# Patient Record
Sex: MALE | Race: BLACK OR AFRICAN AMERICAN | Employment: UNEMPLOYED | URBAN - METROPOLITAN AREA
[De-identification: names, ages, dates, MRNs, and addresses within clinical notes are randomized per-mention and may not be internally consistent; named-entity substitution may affect disease eponyms.]

---

## 2020-04-12 ENCOUNTER — APPOINTMENT (OUTPATIENT)
Dept: NON INVASIVE DIAGNOSTICS | Age: 55
DRG: 175 | End: 2020-04-12
Attending: SPECIALIST
Payer: MEDICAID

## 2020-04-12 ENCOUNTER — HOSPITAL ENCOUNTER (INPATIENT)
Age: 55
LOS: 3 days | Discharge: HOME | DRG: 175 | End: 2020-04-15
Attending: EMERGENCY MEDICINE | Admitting: INTERNAL MEDICINE
Payer: MEDICAID

## 2020-04-12 ENCOUNTER — APPOINTMENT (OUTPATIENT)
Dept: GENERAL RADIOLOGY | Age: 55
DRG: 175 | End: 2020-04-12
Attending: EMERGENCY MEDICINE
Payer: MEDICAID

## 2020-04-12 DIAGNOSIS — I48.92 ATRIAL FLUTTER, UNSPECIFIED TYPE (HCC): Primary | ICD-10-CM

## 2020-04-12 DIAGNOSIS — R07.9 ACUTE CHEST PAIN: ICD-10-CM

## 2020-04-12 DIAGNOSIS — R55 NEAR SYNCOPE: ICD-10-CM

## 2020-04-12 PROBLEM — M79.2 NEUROPATHIC PAIN: Status: ACTIVE | Noted: 2020-04-12

## 2020-04-12 PROBLEM — K42.9 UMBILICAL HERNIA: Status: ACTIVE | Noted: 2020-04-12

## 2020-04-12 PROBLEM — I10 ESSENTIAL HYPERTENSION: Status: ACTIVE | Noted: 2020-04-12

## 2020-04-12 PROBLEM — I48.19 PERSISTENT ATRIAL FIBRILLATION (HCC): Status: ACTIVE | Noted: 2020-04-12

## 2020-04-12 PROBLEM — I11.9 HYPERTENSIVE HEART DISEASE WITHOUT HEART FAILURE: Status: ACTIVE | Noted: 2020-04-12

## 2020-04-12 PROBLEM — E66.01 MORBID OBESITY (HCC): Status: ACTIVE | Noted: 2020-04-12

## 2020-04-12 PROBLEM — I48.91 ATRIAL FIBRILLATION AND FLUTTER (HCC): Status: ACTIVE | Noted: 2020-04-12

## 2020-04-12 LAB
ALBUMIN SERPL-MCNC: 4.2 G/DL (ref 3.5–5)
ALBUMIN/GLOB SERPL: 0.9 {RATIO} (ref 1.1–2.2)
ALP SERPL-CCNC: 111 U/L (ref 45–117)
ALT SERPL-CCNC: 38 U/L (ref 12–78)
ANION GAP SERPL CALC-SCNC: 13 MMOL/L (ref 5–15)
AST SERPL-CCNC: 18 U/L (ref 15–37)
ATRIAL RATE: 122 BPM
ATRIAL RATE: 242 BPM
BASOPHILS # BLD: 0 K/UL (ref 0–0.1)
BASOPHILS NFR BLD: 0 % (ref 0–1)
BILIRUB SERPL-MCNC: 0.4 MG/DL (ref 0.2–1)
BUN SERPL-MCNC: 19 MG/DL (ref 6–20)
BUN/CREAT SERPL: 13 (ref 12–20)
CALCIUM SERPL-MCNC: 10.2 MG/DL (ref 8.5–10.1)
CALCULATED P AXIS, ECG09: 39 DEGREES
CALCULATED R AXIS, ECG10: 10 DEGREES
CALCULATED R AXIS, ECG10: 30 DEGREES
CALCULATED T AXIS, ECG11: 34 DEGREES
CALCULATED T AXIS, ECG11: 69 DEGREES
CHLORIDE SERPL-SCNC: 105 MMOL/L (ref 97–108)
CO2 SERPL-SCNC: 27 MMOL/L (ref 21–32)
COMMENT, HOLDF: NORMAL
CREAT SERPL-MCNC: 1.43 MG/DL (ref 0.7–1.3)
D DIMER PPP FEU-MCNC: 0.48 MG/L FEU (ref 0–0.65)
DIAGNOSIS, 93000: NORMAL
DIAGNOSIS, 93000: NORMAL
DIFFERENTIAL METHOD BLD: ABNORMAL
EOSINOPHIL # BLD: 0.3 K/UL (ref 0–0.4)
EOSINOPHIL NFR BLD: 3 % (ref 0–7)
ERYTHROCYTE [DISTWIDTH] IN BLOOD BY AUTOMATED COUNT: 13.5 % (ref 11.5–14.5)
GLOBULIN SER CALC-MCNC: 4.8 G/DL (ref 2–4)
GLUCOSE SERPL-MCNC: 120 MG/DL (ref 65–100)
HCT VFR BLD AUTO: 48.5 % (ref 36.6–50.3)
HGB BLD-MCNC: 15.8 G/DL (ref 12.1–17)
IMM GRANULOCYTES # BLD AUTO: 0.1 K/UL (ref 0–0.04)
IMM GRANULOCYTES NFR BLD AUTO: 1 % (ref 0–0.5)
LYMPHOCYTES # BLD: 3 K/UL (ref 0.8–3.5)
LYMPHOCYTES NFR BLD: 31 % (ref 12–49)
MAGNESIUM SERPL-MCNC: 1.9 MG/DL (ref 1.6–2.4)
MCH RBC QN AUTO: 30 PG (ref 26–34)
MCHC RBC AUTO-ENTMCNC: 32.6 G/DL (ref 30–36.5)
MCV RBC AUTO: 92.2 FL (ref 80–99)
MONOCYTES # BLD: 0.8 K/UL (ref 0–1)
MONOCYTES NFR BLD: 9 % (ref 5–13)
NEUTS SEG # BLD: 5.4 K/UL (ref 1.8–8)
NEUTS SEG NFR BLD: 57 % (ref 32–75)
NRBC # BLD: 0 K/UL (ref 0–0.01)
NRBC BLD-RTO: 0 PER 100 WBC
P-R INTERVAL, ECG05: 182 MS
PLATELET # BLD AUTO: 288 K/UL (ref 150–400)
PMV BLD AUTO: 9.9 FL (ref 8.9–12.9)
POTASSIUM SERPL-SCNC: 4 MMOL/L (ref 3.5–5.1)
PROT SERPL-MCNC: 9 G/DL (ref 6.4–8.2)
Q-T INTERVAL, ECG07: 418 MS
Q-T INTERVAL, ECG07: 424 MS
QRS DURATION, ECG06: 82 MS
QRS DURATION, ECG06: 98 MS
QTC CALCULATION (BEZET), ECG08: 593 MS
QTC CALCULATION (BEZET), ECG08: 604 MS
RBC # BLD AUTO: 5.26 M/UL (ref 4.1–5.7)
SAMPLES BEING HELD,HOLD: NORMAL
SODIUM SERPL-SCNC: 145 MMOL/L (ref 136–145)
TROPONIN I BLD-MCNC: <0.04 NG/ML (ref 0–0.08)
TROPONIN I SERPL-MCNC: <0.05 NG/ML
TSH SERPL DL<=0.05 MIU/L-ACNC: 0.75 UIU/ML (ref 0.36–3.74)
VENTRICULAR RATE, ECG03: 121 BPM
VENTRICULAR RATE, ECG03: 122 BPM
WBC # BLD AUTO: 9.6 K/UL (ref 4.1–11.1)

## 2020-04-12 PROCEDURE — 96365 THER/PROPH/DIAG IV INF INIT: CPT

## 2020-04-12 PROCEDURE — 99285 EMERGENCY DEPT VISIT HI MDM: CPT

## 2020-04-12 PROCEDURE — 83735 ASSAY OF MAGNESIUM: CPT

## 2020-04-12 PROCEDURE — 74011250637 HC RX REV CODE- 250/637: Performed by: SPECIALIST

## 2020-04-12 PROCEDURE — 74011250637 HC RX REV CODE- 250/637: Performed by: NURSE PRACTITIONER

## 2020-04-12 PROCEDURE — 96366 THER/PROPH/DIAG IV INF ADDON: CPT

## 2020-04-12 PROCEDURE — 74011250636 HC RX REV CODE- 250/636: Performed by: SPECIALIST

## 2020-04-12 PROCEDURE — 94660 CPAP INITIATION&MGMT: CPT

## 2020-04-12 PROCEDURE — 74011000258 HC RX REV CODE- 258: Performed by: STUDENT IN AN ORGANIZED HEALTH CARE EDUCATION/TRAINING PROGRAM

## 2020-04-12 PROCEDURE — 96372 THER/PROPH/DIAG INJ SC/IM: CPT

## 2020-04-12 PROCEDURE — 74011250636 HC RX REV CODE- 250/636: Performed by: STUDENT IN AN ORGANIZED HEALTH CARE EDUCATION/TRAINING PROGRAM

## 2020-04-12 PROCEDURE — 93005 ELECTROCARDIOGRAM TRACING: CPT

## 2020-04-12 PROCEDURE — 99218 HC RM OBSERVATION: CPT

## 2020-04-12 PROCEDURE — 85379 FIBRIN DEGRADATION QUANT: CPT

## 2020-04-12 PROCEDURE — 71045 X-RAY EXAM CHEST 1 VIEW: CPT

## 2020-04-12 PROCEDURE — 84443 ASSAY THYROID STIM HORMONE: CPT

## 2020-04-12 PROCEDURE — 85025 COMPLETE CBC W/AUTO DIFF WBC: CPT

## 2020-04-12 PROCEDURE — 74011000250 HC RX REV CODE- 250: Performed by: STUDENT IN AN ORGANIZED HEALTH CARE EDUCATION/TRAINING PROGRAM

## 2020-04-12 PROCEDURE — 36415 COLL VENOUS BLD VENIPUNCTURE: CPT

## 2020-04-12 PROCEDURE — 84484 ASSAY OF TROPONIN QUANT: CPT

## 2020-04-12 PROCEDURE — C8923 2D TTE W OR W/O FOL W/CON,CO: HCPCS

## 2020-04-12 PROCEDURE — 96375 TX/PRO/DX INJ NEW DRUG ADDON: CPT

## 2020-04-12 PROCEDURE — 80053 COMPREHEN METABOLIC PANEL: CPT

## 2020-04-12 PROCEDURE — 65660000001 HC RM ICU INTERMED STEPDOWN

## 2020-04-12 PROCEDURE — 96376 TX/PRO/DX INJ SAME DRUG ADON: CPT

## 2020-04-12 PROCEDURE — 74011000250 HC RX REV CODE- 250

## 2020-04-12 RX ORDER — FLUTICASONE PROPIONATE 50 MCG
2 SPRAY, SUSPENSION (ML) NASAL DAILY
COMMUNITY

## 2020-04-12 RX ORDER — LISINOPRIL 20 MG/1
20 TABLET ORAL DAILY
COMMUNITY

## 2020-04-12 RX ORDER — ONDANSETRON 2 MG/ML
4 INJECTION INTRAMUSCULAR; INTRAVENOUS
Status: DISCONTINUED | OUTPATIENT
Start: 2020-04-12 | End: 2020-04-15 | Stop reason: HOSPADM

## 2020-04-12 RX ORDER — ATORVASTATIN CALCIUM 20 MG/1
20 TABLET, FILM COATED ORAL DAILY
COMMUNITY

## 2020-04-12 RX ORDER — HEPARIN SODIUM 5000 [USP'U]/ML
5000 INJECTION, SOLUTION INTRAVENOUS; SUBCUTANEOUS EVERY 8 HOURS
Status: DISCONTINUED | OUTPATIENT
Start: 2020-04-13 | End: 2020-04-13

## 2020-04-12 RX ORDER — SODIUM CHLORIDE 0.9 % (FLUSH) 0.9 %
5-40 SYRINGE (ML) INJECTION EVERY 8 HOURS
Status: DISCONTINUED | OUTPATIENT
Start: 2020-04-12 | End: 2020-04-15 | Stop reason: HOSPADM

## 2020-04-12 RX ORDER — GABAPENTIN 600 MG/1
600 TABLET ORAL
COMMUNITY

## 2020-04-12 RX ORDER — DIGOXIN 0.25 MG/ML
250 INJECTION INTRAMUSCULAR; INTRAVENOUS EVERY 6 HOURS
Status: DISPENSED | OUTPATIENT
Start: 2020-04-12 | End: 2020-04-13

## 2020-04-12 RX ORDER — MULTIVIT WITH MINERALS/HERBS
1 TABLET ORAL DAILY
COMMUNITY

## 2020-04-12 RX ORDER — DILTIAZEM HYDROCHLORIDE 5 MG/ML
10 INJECTION INTRAVENOUS
Status: COMPLETED | OUTPATIENT
Start: 2020-04-12 | End: 2020-04-12

## 2020-04-12 RX ORDER — FUROSEMIDE 20 MG/1
20 TABLET ORAL DAILY
COMMUNITY

## 2020-04-12 RX ORDER — LUBIPROSTONE 8 UG/1
8 CAPSULE, GELATIN COATED ORAL 2 TIMES DAILY WITH MEALS
COMMUNITY

## 2020-04-12 RX ORDER — ATORVASTATIN CALCIUM 20 MG/1
20 TABLET, FILM COATED ORAL DAILY
Status: DISCONTINUED | OUTPATIENT
Start: 2020-04-13 | End: 2020-04-15 | Stop reason: HOSPADM

## 2020-04-12 RX ORDER — LORATADINE AND PSEUDOEPHEDRINE SULFATE 10; 240 MG/1; MG/1
1 TABLET, EXTENDED RELEASE ORAL DAILY
COMMUNITY

## 2020-04-12 RX ORDER — GABAPENTIN 600 MG/1
600 TABLET ORAL
Status: DISCONTINUED | OUTPATIENT
Start: 2020-04-12 | End: 2020-04-15 | Stop reason: HOSPADM

## 2020-04-12 RX ORDER — LORATADINE 10 MG/1
10 TABLET ORAL DAILY
Status: DISCONTINUED | OUTPATIENT
Start: 2020-04-13 | End: 2020-04-12

## 2020-04-12 RX ORDER — METOPROLOL TARTRATE 50 MG/1
50 TABLET ORAL EVERY 8 HOURS
Status: DISCONTINUED | OUTPATIENT
Start: 2020-04-12 | End: 2020-04-14

## 2020-04-12 RX ORDER — CITALOPRAM 20 MG/1
20 TABLET, FILM COATED ORAL DAILY
Status: DISCONTINUED | OUTPATIENT
Start: 2020-04-13 | End: 2020-04-15 | Stop reason: HOSPADM

## 2020-04-12 RX ORDER — ENOXAPARIN SODIUM 100 MG/ML
1 INJECTION SUBCUTANEOUS ONCE
Status: COMPLETED | OUTPATIENT
Start: 2020-04-12 | End: 2020-04-12

## 2020-04-12 RX ORDER — FLUTICASONE PROPIONATE 50 MCG
2 SPRAY, SUSPENSION (ML) NASAL DAILY
Status: DISCONTINUED | OUTPATIENT
Start: 2020-04-13 | End: 2020-04-12

## 2020-04-12 RX ORDER — SODIUM CHLORIDE 0.9 % (FLUSH) 0.9 %
5-40 SYRINGE (ML) INJECTION AS NEEDED
Status: DISCONTINUED | OUTPATIENT
Start: 2020-04-12 | End: 2020-04-15 | Stop reason: HOSPADM

## 2020-04-12 RX ORDER — LUBIPROSTONE 8 UG/1
8 CAPSULE, GELATIN COATED ORAL 2 TIMES DAILY WITH MEALS
Status: DISCONTINUED | OUTPATIENT
Start: 2020-04-12 | End: 2020-04-15 | Stop reason: HOSPADM

## 2020-04-12 RX ORDER — AMLODIPINE BESYLATE 10 MG/1
10 TABLET ORAL DAILY
COMMUNITY

## 2020-04-12 RX ORDER — DILTIAZEM HYDROCHLORIDE 5 MG/ML
INJECTION INTRAVENOUS
Status: COMPLETED
Start: 2020-04-12 | End: 2020-04-12

## 2020-04-12 RX ORDER — AMIODARONE HYDROCHLORIDE 200 MG/1
400 TABLET ORAL 3 TIMES DAILY
Status: DISCONTINUED | OUTPATIENT
Start: 2020-04-12 | End: 2020-04-14

## 2020-04-12 RX ORDER — ACETAMINOPHEN 325 MG/1
650 TABLET ORAL
Status: DISCONTINUED | OUTPATIENT
Start: 2020-04-12 | End: 2020-04-15 | Stop reason: HOSPADM

## 2020-04-12 RX ORDER — CITALOPRAM 20 MG/1
20 TABLET, FILM COATED ORAL DAILY
COMMUNITY

## 2020-04-12 RX ADMIN — LUBIPROSTONE 8 MCG: 8 CAPSULE, GELATIN COATED ORAL at 18:15

## 2020-04-12 RX ADMIN — DILTIAZEM HYDROCHLORIDE 10 MG: 5 INJECTION, SOLUTION INTRAVENOUS at 14:29

## 2020-04-12 RX ADMIN — DIGOXIN 250 MCG: 0.25 INJECTION INTRAMUSCULAR; INTRAVENOUS at 14:27

## 2020-04-12 RX ADMIN — Medication 10 ML: at 18:15

## 2020-04-12 RX ADMIN — ENOXAPARIN SODIUM 200 MG: 100 INJECTION SUBCUTANEOUS at 14:31

## 2020-04-12 RX ADMIN — GABAPENTIN 600 MG: 600 TABLET, FILM COATED ORAL at 22:25

## 2020-04-12 RX ADMIN — SODIUM CHLORIDE 1.5 ML: 9 INJECTION INTRAMUSCULAR; INTRAVENOUS; SUBCUTANEOUS at 14:12

## 2020-04-12 RX ADMIN — DILTIAZEM HYDROCHLORIDE 10 MG/HR: 5 INJECTION, SOLUTION INTRAVENOUS at 16:52

## 2020-04-12 RX ADMIN — METOPROLOL TARTRATE 50 MG: 50 TABLET, FILM COATED ORAL at 17:30

## 2020-04-12 RX ADMIN — AMIODARONE HYDROCHLORIDE 400 MG: 200 TABLET ORAL at 14:30

## 2020-04-12 RX ADMIN — DILTIAZEM HYDROCHLORIDE 2.5 MG/HR: 5 INJECTION, SOLUTION INTRAVENOUS at 14:30

## 2020-04-12 RX ADMIN — DILTIAZEM HYDROCHLORIDE 10 MG: 5 INJECTION INTRAVENOUS at 13:04

## 2020-04-12 RX ADMIN — Medication 10 ML: at 22:26

## 2020-04-12 RX ADMIN — DILTIAZEM HYDROCHLORIDE 12.5 MG/HR: 5 INJECTION, SOLUTION INTRAVENOUS at 17:13

## 2020-04-12 NOTE — H&P
[facility] [facility] Adult  Hospitalist Group  History and Physical    Primary Care Provider: Luther Ayers MD  Date of Service:  4/12/2020    Subjective:     Ángel Zabala is a 49 y.o. male who to [facility] emergency department with complaint of chest pain, diaphoresis and near syncope x2 today. Patient reported that around 9 AM he had an episode of near syncope which resolved and then again this afternoon at [facility] when he also had chest pain for approximately 15 minutes which did not radiate. He reported feeling palpitations, shortness of breath diaphoresis. He was transferred to [facility] [place] ED with concern for STEMI. He was seen by cardiology Dr. Dick Montesinos who determined he did not have STEMI and does not warrant cardiac cath at this time. Patient was noted to be in atrial flutter/A. fib persistent and was started on a Cardizem drip. In ED included negative d-dimer, negative troponin x1, normal CBC, elevated glucose, elevated creatinine of 1.43 with GFR greater than 60. No history of CKD per patient, however, he is extremely morbidly obese and has a very significant family history of diabetes including both of his parents, both of his grandparents and all of his siblings. He reports that he does not have diabetes, although I suspect that he does based on his elevated glucose and creatinine, his weight, amily history as well as his explanation of being on Lasix due to lower extremity edema which he reports is not related to his heart but is because he \"drinks a ton of water. \"  He reports drinking 2 to 4 gallons of water per day plus milk and juice. He also reports polyphagia but states that he prefers salty foods over sweet. At this time the patient is stable, alert and oriented x4, moves all extremities. He states he has never been  and has no children, but does help take care of his nephew who is 15.  He reports that today is the 3rd episode of near syncope w/ feeling palpitations that he has had and that the first one was approximately 6 months ago. He reports that he has not had it evaluated prior to today. At this time, patient reports fatigue, polydipsia but denies dizziness, double vision, HA, CP, SOB, palpitations, cough, abdominal pain, n/v/d or weakness. He reports chronic neuropathic pain to E. At this time, we will admit to step down. Review of Systems:    A comprehensive review of systems was negative except for that written in the History of Present Illness. Past Medical History:   Diagnosis Date    Atrial flutter ([address]     Depression     Edema of both legs     Elevated serum creatinine     Elevated serum glucose     HLD (hyperlipidemia)     Hypertensive heart disease without heart failure 4/12/2020    Morbid obesity ([facility] 4/12/2020    Near syncope     Neuropathic pain 4/12/2020    SHARA treated with BiPAP     Persistent atrial fibrillation 4/32/6888    Umbilical hernia 9/98/8197      Past Surgical History:   Procedure Laterality Date    HX HEENT      Jaw    HX ORTHOPAEDIC      Left Femur    HX ORTHOPAEDIC      Left Hip     Prior to Admission medications    Medication Sig Start Date End Date Taking? Authorizing Provider   lubiPROStone (Amitiza) 8 mcg capsule Take 8 mcg by mouth two (2) times daily (with meals). Yes Provider, Historical   citalopram (CELEXA) 20 mg tablet Take 20 mg by mouth daily. Yes Provider, Historical   lisinopriL (PRINIVIL, ZESTRIL) 20 mg tablet Take 20 mg by mouth daily. Yes Provider, Historical   furosemide (LASIX) 20 mg tablet Take 20 mg by mouth daily. Indications: visible water retention   Yes Provider, Historical   amLODIPine (NORVASC) 10 mg tablet Take 10 mg by mouth daily. Yes Provider, Historical   atorvastatin (LIPITOR) 20 mg tablet Take 20 mg by mouth daily. Yes Provider, Historical   b complex vitamins tablet Take 1 Tab by mouth daily.    Yes Provider, Historical   gabapentin (NEURONTIN) 600 mg tablet Take 600 mg by mouth nightly. Yes Provider, Historical   fluticasone propionate (Flonase Allergy Relief) 50 mcg/actuation nasal spray 2 Sprays by Both Nostrils route daily. Yes Provider, Historical   loratadine-pseudoephedrine (Claritin-D 24 Hour)  mg per tablet Take 1 Tab by mouth daily. Yes Provider, Historical     No Known Allergies   Family History   Problem Relation Age of Onset    Diabetes Mother     Schizophrenia Mother     Diabetes Father     Prostate Cancer Father     Kidney Disease Father         ESRD ON HD    Diabetes Sister     Diabetes Brother     Kidney Disease Brother         ESRD ON HD        SOCIAL HISTORY:  Patient resides at home with his nephew  Patient ambulates independently  Smoking history: Never  Alcohol history: Occasional    Recent Travel Screening and Travel History documentation     Travel Screening       Question Response     Do you have any of the following symptoms? Cough     In the last month, have you been in contact with someone who was confirmed or suspected to have Coronavirus / COVID-19? No / Unsure     Have you traveled internationally in the last month? No      Travel History   Travel since 03/12/20     No documented travel since 03/12/20          Objective:       Physical Exam:   Visit Vitals  /64 (BP 1 Location: Left arm, BP Patient Position: At rest)   Pulse (!) 124   Temp 98.8 °F (37.1 °C)   Resp 18   Wt (!) 206 kg (454 lb 2.4 oz)   SpO2 93%     General:  Alert, cooperative, no distress, appears stated age, super morbidly obese   Head:  Normocephalic, without obvious abnormality, atraumatic. Eyes:  Conjunctivae/corneas clear. Pupils equal, round, reactive to light. Extraocular movements intact. Lungs:   Clear to auscultation bilaterally. Abdomen:   Soft, non-tender. Bowel sounds normal. +protuberant, +hernia   Extremities: Extremities normal, atraumatic, no cyanosis, chronic edema, slight, L>R   Pulses: 2+ and symmetric all extremities.    Skin: Skin color, texture, turgor normal. No rashes or lesions. Lymph nodes: Cervical, supraclavicular, and axillary nodes normal.   Neurologic: CNII-XII intact. Normal strength, sensation, and reflexes throughout. Cap refill: Brisk, less than 3 seconds  Pulses: 2+, symmetric in all extremities    Data Review: All diagnostic labs and studies have been reviewed. Recent Results (from the past 24 hour(s))   EKG, 12 LEAD, INITIAL    Collection Time: 04/12/20 12:26 PM   Result Value Ref Range    Ventricular Rate 122 BPM    Atrial Rate 122 BPM    P-R Interval 182 ms    QRS Duration 98 ms    Q-T Interval 424 ms    QTC Calculation (Bezet) 604 ms    Calculated P Axis 39 degrees    Calculated R Axis 10 degrees    Calculated T Axis 34 degrees    Diagnosis       Possible Atrial flutter with 2 to 1 block  ST elevation, consider inferolateral injury or acute infarct  Nonspecific ST segment changes  Abnormal ECG  No previous ECGs available  Confirmed by Yoly Degroot MD, [facility] ([id number]) on 4/12/2020 2:49:18 PM     TSH 3RD GENERATION    Collection Time: 04/12/20 12:29 PM   Result Value Ref Range    TSH 0.75 0.36 - 3.74 uIU/mL   CBC WITH AUTOMATED DIFF    Collection Time: 04/12/20 12:29 PM   Result Value Ref Range    WBC 9.6 4.1 - 11.1 K/uL    RBC 5.26 4.10 - 5.70 M/uL    HGB 15.8 12.1 - 17.0 g/dL    HCT 48.5 36.6 - 50.3 %    MCV 92.2 80.0 - 99.0 FL    MCH 30.0 26.0 - 34.0 PG    MCHC 32.6 30.0 - 36.5 g/dL    RDW 13.5 11.5 - 14.5 %    PLATELET 836 898 - 910 K/uL    MPV 9.9 8.9 - 12.9 FL    NRBC 0.0 0  WBC    ABSOLUTE NRBC 0.00 0.00 - 0.01 K/uL    NEUTROPHILS 57 32 - 75 %    LYMPHOCYTES 31 12 - 49 %    MONOCYTES 9 5 - 13 %    EOSINOPHILS 3 0 - 7 %    BASOPHILS 0 0 - 1 %    IMMATURE GRANULOCYTES 1 (H) 0.0 - 0.5 %    ABS. NEUTROPHILS 5.4 1.8 - 8.0 K/UL    ABS. LYMPHOCYTES 3.0 0.8 - 3.5 K/UL    ABS. MONOCYTES 0.8 0.0 - 1.0 K/UL    ABS. EOSINOPHILS 0.3 0.0 - 0.4 K/UL    ABS. BASOPHILS 0.0 0.0 - 0.1 K/UL    ABS. IMM.  GRANS. 0.1 (H) 0.00 - 0.04 K/UL DF AUTOMATED     MAGNESIUM    Collection Time: 04/12/20 12:29 PM   Result Value Ref Range    Magnesium 1.9 1.6 - 2.4 mg/dL   METABOLIC PANEL, COMPREHENSIVE    Collection Time: 04/12/20 12:29 PM   Result Value Ref Range    Sodium 145 136 - 145 mmol/L    Potassium 4.0 3.5 - 5.1 mmol/L    Chloride 105 97 - 108 mmol/L    CO2 27 21 - 32 mmol/L    Anion gap 13 5 - 15 mmol/L    Glucose 120 (H) 65 - 100 mg/dL    BUN 19 6 - 20 MG/DL    Creatinine 1.43 (H) 0.70 - 1.30 MG/DL    BUN/Creatinine ratio 13 12 - 20      GFR est AA >60 >60 ml/min/1.73m2    GFR est non-AA 52 (L) >60 ml/min/1.73m2    Calcium 10.2 (H) 8.5 - 10.1 MG/DL    Bilirubin, total 0.4 0.2 - 1.0 MG/DL    ALT (SGPT) 38 12 - 78 U/L    AST (SGOT) 18 15 - 37 U/L    Alk. phosphatase 111 45 - 117 U/L    Protein, total 9.0 (H) 6.4 - 8.2 g/dL    Albumin 4.2 3.5 - 5.0 g/dL    Globulin 4.8 (H) 2.0 - 4.0 g/dL    A-G Ratio 0.9 (L) 1.1 - 2.2     TROPONIN I    Collection Time: 04/12/20 12:29 PM   Result Value Ref Range    Troponin-I, Qt. <0.05 <0.05 ng/mL   SAMPLES BEING HELD    Collection Time: 04/12/20 12:29 PM   Result Value Ref Range    SAMPLES BEING HELD 1BLU     COMMENT        Add-on orders for these samples will be processed based on acceptable specimen integrity and analyte stability, which may vary by analyte.    POC TROPONIN-I    Collection Time: 04/12/20 12:29 PM   Result Value Ref Range    Troponin-I (POC) <0.04 0.00 - 0.08 ng/mL   EKG, 12 LEAD, INITIAL    Collection Time: 04/12/20  1:29 PM   Result Value Ref Range    Ventricular Rate 121 BPM    Atrial Rate 242 BPM    QRS Duration 82 ms    Q-T Interval 418 ms    QTC Calculation (Bezet) 593 ms    Calculated R Axis 30 degrees    Calculated T Axis 69 degrees    Diagnosis       Atrial flutter  ST elevation, consider inferior injury or acute infarct    When compared with ECG of 12-APR-2020 12:33,  MANUAL COMPARISON REQUIRED, DATA IS UNCONFIRMED  Confirmed by Mae Knox MD, [facility] [place] [facility] ([id number]) on 4/12/2020 2:50:52 PM     D DIMER Collection Time: 04/12/20  1:47 PM   Result Value Ref Range    D-dimer 0.48 0.00 - 0.65 mg/L FEU       Chest x-ray  CXR Results  (Last 48 hours)               04/12/20 1243  XR CHEST PORT Final result    Impression:  IMPRESSION: No acute cardiopulmonary disease. Narrative:  INDICATION: Chest pain. Portable AP semiupright views of the chest.       There is no prior study for direct comparison. Cardiomediastinal silhouette is within normal limits. Lungs are grossly clear   bilaterally. Pleural spaces are normal. Osseous structures are intact. Echo 4/12/2020:  Result status: Final result   · Image quality for this study was technically difficult. · Contrast used: DEFINITY. · Normal cavity size, systolic function (ejection fraction normal) and diastolic function. Severe concentric hypertrophy. Estimated left ventricular ejection fraction is 65 - 70%. No regional wall motion abnormality noted. Normal left ventricular strain. · Aortic valve sclerosis with no significant stenosis. · Mitral valve non-specific thickening.          Assessment:     Principal Problem:    Atrial flutter ([address] (4/12/2020)    Active Problems:    Persistent atrial fibrillation (4/12/2020)      Morbid obesity ([address] (4/12/2020)      Hypertensive heart disease without heart failure (4/12/2020)      Neuropathic pain (4/12/2020)      Chest pain (4/12/2020)      Near syncope ()      Edema of both legs ()      Elevated serum glucose ()      Elevated serum creatinine ()      Atrial fibrillation and flutter ([address] (4/12/2020)        Plan:     Near syncope x2: Suspect 2/2 afib/aflutter  -Seen by cardiology in ED; no previous cardiac hx  -CXR neg  -Orthostatic VS x3    Persistent Afib/Aflutter  -New dx  -Seen by cards in ED, started on dig, amiodarone and metoprolol PO  -On cardizem gtt, goal for HR less than 100  -Trop neg x1  -TSH NL   -Echo complete, unremarkable  -Appreciate cards input    Chest Pain  -Resolved, likely 2/2 above  -Trop negative x1  -Appreciate cards input; no cath indicated at this time    HTN  -Takes lisinopril and amlodipine at home; hold for now  -Started on amiodarone, dig and metoprolol by cards today  -Currently on cardizem gtt    Elevated creatinine: w/ normal GFR for AA  -Unclear if this is CKD as pt has no hx  -Avoid nephrotoxic agents  -Repeat labs in AM    Elevated glucose: Denies hx DM  -Suspect he has DM d/t elevated glucose on labs, hx of polydipsia/phagia/uria, strong family hx, morbid obesity  -Check A1c, fasting glucose in AM; also check ADH    Hx BLE edema  -Pt reports d/t drinking 2-4 gallons of water daily  -Hold lasix for now  -Only trace Edema; ED ordered doppler of LLE d/t neuropathic pain/edema-pending  -Check for DM, ADH  -Daily weights, I/Os  -Ambulate, OOB TID and PRN    Hx Depression  -Continue celexa    Hx HLD  -Continue lipitor    Hx LLE neuropathy  -Continue gabapentin    DVTppx: Heparin  GIppx: NA  Diet: Cardiac  Code Status: Full  Activity: Ambulates independently, OOB TID and PRN w/ assist  Discharge: TBD      Signed By: Dee Hector NP     April 12, 2020

## 2020-04-12 NOTE — ROUTINE PROCESS
TRANSFER - OUT REPORT: 
 
Verbal report given to Barbara Mata RN (name) on Brenda Lion  being transferred to [facility] ED (unit) for routine progression of care Report consisted of patients Situation, Background, Assessment and  
Recommendations(SBAR). Information from the following report(s) SBAR, ED Summary, [facility] and Recent Results was reviewed with the receiving nurse. Lines:  
Peripheral IV 04/12/20 Right Antecubital (Active) Site Assessment Clean, dry, & intact 4/12/2020 12:27 PM  
Phlebitis Assessment 0 4/12/2020 12:27 PM  
Infiltration Assessment 0 4/12/2020 12:27 PM  
Dressing Status Clean, dry, & intact 4/12/2020 12:27 PM  
Dressing Type Transparent 4/12/2020 12:27 PM  
Hub Color/Line Status Flushed;Patent 4/12/2020 12:27 PM  
   
Peripheral IV 04/12/20 Left Hand (Active) Site Assessment Clean, dry, & intact 4/12/2020 12:32 PM  
Phlebitis Assessment 0 4/12/2020 12:32 PM  
Infiltration Assessment 0 4/12/2020 12:32 PM  
Dressing Status Clean, dry, & intact 4/12/2020 12:32 PM  
Dressing Type Transparent 4/12/2020 12:32 PM  
Hub Color/Line Status Flushed;Patent 4/12/2020 12:32 PM  
  
 
Opportunity for questions and clarification was provided. Patient transported with: 
 Critical Care Transport

## 2020-04-12 NOTE — ED NOTES
Patient presents as a transfer from [facility] for possible acute MI and near syncope, I evaluated the patient at the bedside as did cardiology Leighann Daniels), repeated the EKG which indicated atrial flutter with a 2:1 block, still in RVR. Maintaining good systolic pressures. Low Wells score, denies any hx DVT/PE, hemoptysis, recent surgery/hospitalization or extended travel, no hx malignancy but did report some pain in his left leg albeit more chronic in nature so I added on a dimer which was ultimately negative so doubt pulmonary embolism. Trace pitting edema in the lower extremities with longstanding hx exertional dyspnea so may have concomitant CHF. US LLE to rule out DVT pending. Repeat EKG here does not indicate findings that would suggest STEMI, interpretation is below. Ordered second bolus of IV Cardizem 10 mg for a total of 20 mg, infusion started for persistent rapid ventricular rate in the 120's. Dr. Enedelia Abbott agreed with initiating Lovenox 1 mg/kg as he woke up with these symptoms, unclear time of onset. Will need admission for continued management, echocardiogram, rate control. EKG as interpreted by me:  Performed at 1329, indicates atrial flutter with a ventricular rate of 121, normal axis, otherwise normal intervals, no ST or T wave changes that meet STEMI criteria. Hospitalist Perfect Serve for Admission  2:29 PM    ED Room Number:   YG22/52  Patient Name and age:  Efrain Billy 49 y.o.  male  Working Diagnosis:     1. Atrial flutter, unspecified type ([address]    2. Near syncope    3.  Acute chest pain      COVID-19 Suspicion:   no  Readmission:    no  Isolation Requirements:  no  Recommended Level of Care: telemetry  Code Status:    Full Code  Department:    [facility] Adult ED - 21   Other:      Transferred from [facility] for near syncope and possible MI but presented in atrial flutter, seen by cardiology Leighann Dainels), agreed with Cardizem infusion (received total of 20 mg IV bolus) and initiating Lovenox for now, he did not feel this needed cath lab activation.

## 2020-04-12 NOTE — CONSULTS
Consult Note      Assessment:    Patient Active Problem List   Diagnosis Code    Persistent atrial fibrillation I48.19    Atrial flutter (HCC) I48.92    Essential hypertension I10    Morbid obesity ([facility] [address] F89.65    Umbilical hernia V40.0    Hypertensive heart disease without heart failure I11.9    Neuropathic pain M79.2       Recommendations:    I suspect his symptoms are due to acute onset of atrial flutter/fibrillation today. He has morbid obesity and treated hypertension, severe neuropathy invloving LLE, umbilical hernia. Will attempt medical cardioversion, admit to hospitalist, discussed with ER physician. No evidence for STEMI. Mela Bacon MD  [phone]  Babar Kaiser is a 49 y.o. male who presented 4/12/2020 with complaints of shortness of breath, chest pain, rapid heart rate, dizziness and diaphoresis, onset today. The symptoms began approximately 3 hours ago. He has no history of cardiac disease including CAD, MI, CHF, atrial fib and arrhythmias/ectopy. He has morbid obesity considering gastric bypass, also treated hypertension. Initial EKG was read by computer as possible STEMI, he has no chest pain at present, fast HR about 130. Examination by the ER physician suggested the possibility of MI and atrial fib. At present the patient has slightly improved since initial presentation. Therapy thus far has included medication for heart rate control. Cardiology has been consulted to assist in the management of this patient.     Current Facility-Administered Medications   Medication Dose Route Frequency    digoxin (LANOXIN) injection 250 mcg  250 mcg IntraVENous Q6H    amiodarone (CORDARONE) tablet 400 mg  400 mg Oral TID    dilTIAZem (CARDIZEM) 125 mg in dextrose 5% 125 mL infusion  0-15 mg/hr IntraVENous TITRATE    dilTIAZem (CARDIZEM) injection 10 mg  10 mg IntraVENous NOW    enoxaparin (LOVENOX) injection 200 mg  1 mg/kg SubCUTAneous ONCE    metoprolol tartrate (LOPRESSOR) tablet 50 mg  50 mg Oral Q8H     No current outpatient medications on file. Past Medical History:   Diagnosis Date    Hypertension      Patient Active Problem List   Diagnosis Code    Persistent atrial fibrillation I48.19    Atrial flutter (HCC) I48.92    Essential hypertension I10    Morbid obesity ([facility] [address] W28.07    Umbilical hernia M63.8    Hypertensive heart disease without heart failure I11.9    Neuropathic pain M79.2     No Known Allergies  Social History     Tobacco Use    Smoking status: Never Smoker    Smokeless tobacco: Never Used   Substance Use Topics    Alcohol use: Yes     Comment: rarely    Drug use: Yes     Types: Marijuana     History reviewed. No pertinent family history. Review of Symptoms:  A comprehensive review of systems was negative except for that written in the HPI. Objective:      Visit Vitals  /72   Pulse (!) 125   Temp 98.3 °F (36.8 °C)   Resp 18   Wt (!) 206 kg (454 lb 2.4 oz)   SpO2 96%      Physical Exam    Visit Vitals  /72   Pulse (!) 125   Temp 98.3 °F (36.8 °C)   Resp 18   Wt (!) 206 kg (454 lb 2.4 oz)   SpO2 96%     General Appearance:  Well developed, well nourished,alert and oriented x 3, morbidly obese individual in no acute distress. Ears/Nose/Mouth/Throat:   Hearing grossly normal.         Neck: Supple. Chest:   Lungs clear to auscultation bilaterally. Cardiovascular:  Regular rate and rhythm, S1, S2 normal, no murmur. Abdomen:   Soft, non-tender, bowel sounds are active, umbilical hernia. Extremities: Trace edema bilaterally. Skin: Warm and dry.                Cardiographics    Telemetry: AFIB  ECG: atrial fibrillation, rate 130  Echocardiogram: Normal and reviewed by myself    Labs:   Recent Results (from the past 24 hour(s))   EKG, 12 LEAD, INITIAL    Collection Time: 04/12/20 12:26 PM   Result Value Ref Range    Ventricular Rate 129 BPM    Atrial Rate 129 BPM    P-R Interval 160 ms    QRS Duration 96 ms    Q-T Interval 330 ms    QTC Calculation (Bezet) 483 ms    Calculated P Axis 45 degrees    Calculated R Axis 12 degrees    Calculated T Axis 27 degrees    Diagnosis       Sinus tachycardia with premature supraventricular complexes  ST elevation, consider inferolateral injury or acute infarct  ** ** ACUTE MI / STEMI ** **  Abnormal ECG  No previous ECGs available     TSH 3RD GENERATION    Collection Time: 04/12/20 12:29 PM   Result Value Ref Range    TSH 0.75 0.36 - 3.74 uIU/mL   CBC WITH AUTOMATED DIFF    Collection Time: 04/12/20 12:29 PM   Result Value Ref Range    WBC 9.6 4.1 - 11.1 K/uL    RBC 5.26 4.10 - 5.70 M/uL    HGB 15.8 12.1 - 17.0 g/dL    HCT 48.5 36.6 - 50.3 %    MCV 92.2 80.0 - 99.0 FL    MCH 30.0 26.0 - 34.0 PG    MCHC 32.6 30.0 - 36.5 g/dL    RDW 13.5 11.5 - 14.5 %    PLATELET 803 019 - 178 K/uL    MPV 9.9 8.9 - 12.9 FL    NRBC 0.0 0  WBC    ABSOLUTE NRBC 0.00 0.00 - 0.01 K/uL    NEUTROPHILS 57 32 - 75 %    LYMPHOCYTES 31 12 - 49 %    MONOCYTES 9 5 - 13 %    EOSINOPHILS 3 0 - 7 %    BASOPHILS 0 0 - 1 %    IMMATURE GRANULOCYTES 1 (H) 0.0 - 0.5 %    ABS. NEUTROPHILS 5.4 1.8 - 8.0 K/UL    ABS. LYMPHOCYTES 3.0 0.8 - 3.5 K/UL    ABS. MONOCYTES 0.8 0.0 - 1.0 K/UL    ABS. EOSINOPHILS 0.3 0.0 - 0.4 K/UL    ABS. BASOPHILS 0.0 0.0 - 0.1 K/UL    ABS. IMM.  GRANS. 0.1 (H) 0.00 - 0.04 K/UL    DF AUTOMATED     MAGNESIUM    Collection Time: 04/12/20 12:29 PM   Result Value Ref Range    Magnesium 1.9 1.6 - 2.4 mg/dL   METABOLIC PANEL, COMPREHENSIVE    Collection Time: 04/12/20 12:29 PM   Result Value Ref Range    Sodium 145 136 - 145 mmol/L    Potassium 4.0 3.5 - 5.1 mmol/L    Chloride 105 97 - 108 mmol/L    CO2 27 21 - 32 mmol/L    Anion gap 13 5 - 15 mmol/L    Glucose 120 (H) 65 - 100 mg/dL    BUN 19 6 - 20 MG/DL    Creatinine 1.43 (H) 0.70 - 1.30 MG/DL    BUN/Creatinine ratio 13 12 - 20      GFR est AA >60 >60 ml/min/1.73m2    GFR est non-AA 52 (L) >60 ml/min/1.73m2    Calcium 10.2 (H) 8.5 - 10.1 MG/DL Bilirubin, total 0.4 0.2 - 1.0 MG/DL    ALT (SGPT) 38 12 - 78 U/L    AST (SGOT) 18 15 - 37 U/L    Alk. phosphatase 111 45 - 117 U/L    Protein, total 9.0 (H) 6.4 - 8.2 g/dL    Albumin 4.2 3.5 - 5.0 g/dL    Globulin 4.8 (H) 2.0 - 4.0 g/dL    A-G Ratio 0.9 (L) 1.1 - 2.2     TROPONIN I    Collection Time: 04/12/20 12:29 PM   Result Value Ref Range    Troponin-I, Qt. <0.05 <0.05 ng/mL   SAMPLES BEING HELD    Collection Time: 04/12/20 12:29 PM   Result Value Ref Range    SAMPLES BEING HELD 1BLU     COMMENT        Add-on orders for these samples will be processed based on acceptable specimen integrity and analyte stability, which may vary by analyte.    POC TROPONIN-I    Collection Time: 04/12/20 12:29 PM   Result Value Ref Range    Troponin-I (POC) <0.04 0.00 - 0.08 ng/mL   EKG, 12 LEAD, INITIAL    Collection Time: 04/12/20  1:29 PM   Result Value Ref Range    Ventricular Rate 121 BPM    Atrial Rate 242 BPM    QRS Duration 82 ms    Q-T Interval 418 ms    QTC Calculation (Bezet) 593 ms    Calculated R Axis 30 degrees    Calculated T Axis 69 degrees    Diagnosis       Atrial flutter  ST elevation, consider inferior injury or acute infarct  ** ** ACUTE MI / STEMI ** **  When compared with ECG of 12-APR-2020 12:33,  MANUAL COMPARISON REQUIRED, DATA IS UNCONFIRMED         Gris Belcher MD

## 2020-04-12 NOTE — ED TRIAGE NOTES
Pt arrives via ambulance from [address] [place] [address] for chest pain, STEMI called prior to eval. 324 ASA given PTA, 10 mg Cardizem given prior to arrival. Pt states \"he feels much better\"

## 2020-04-12 NOTE — ROUTINE PROCESS
TRANSFER - OUT REPORT: 
 
Verbal report given to Staff RN(name) on Jaxson Sons  being transferred to CVSU(unit) for routine progression of care Report consisted of patients Situation, Background, Assessment and  
Recommendations(SBAR). Information from the following report(s) SBAR, Kardex, ED Summary, [facility] and Recent Results was reviewed with the receiving nurse. Lines:  
Peripheral IV 04/12/20 Right Antecubital (Active) Site Assessment Clean, dry, & intact 4/12/2020 12:27 PM  
Phlebitis Assessment 0 4/12/2020 12:27 PM  
Infiltration Assessment 0 4/12/2020 12:27 PM  
Dressing Status Clean, dry, & intact 4/12/2020 12:27 PM  
Dressing Type Transparent 4/12/2020 12:27 PM  
Hub Color/Line Status Flushed;Patent 4/12/2020 12:27 PM  
   
Peripheral IV 04/12/20 Left Hand (Active) Site Assessment Clean, dry, & intact 4/12/2020 12:32 PM  
Phlebitis Assessment 0 4/12/2020 12:32 PM  
Infiltration Assessment 0 4/12/2020 12:32 PM  
Dressing Status Clean, dry, & intact 4/12/2020 12:32 PM  
Dressing Type Transparent 4/12/2020 12:32 PM  
Hub Color/Line Status Flushed;Patent 4/12/2020 12:32 PM  
  
 
Opportunity for questions and clarification was provided. Patient transported with: 
 O2 @ 3 liters Monitor RN

## 2020-04-12 NOTE — ED NOTES
Critical Care Transport here to transport patient to [facility] ED. VSS. Respirations equal and unlabored on RA. Patient continues to deny chest pain at this time. IV x 2 saline locked. Patient in no acute distress upon transfer.

## 2020-04-12 NOTE — PROGRESS NOTES
Admission Medication Reconciliation:    Information obtained from:  Patient,   RxQuery data available¹:  no    Comments/Recommendations: Updated PTA meds/reviewed patient's allergies. 1)  Spoke with patient over the phone to confirm PTA medication list. Used  to verify gabapentin doses. 2)  Medication changes (since last review): Added  -Gabapentin 600mg QHS  -ClaritinD po QD (i would recommend just Claritin)  -Floanse  -B complex    Adjusted  - none    Removed  - none    3) Last home doses were taken yesterday, notified provider with the additional meds that were added to the PTA med list.       [facility] benefit data reflects medications filled and processed through the patient's insurance, however   this data does NOT capture whether the medication was picked up or is currently being taken by the patient. Allergies:  Patient has no known allergies. Significant PMH/Disease States:   Past Medical History:   Diagnosis Date    Atrial flutter ([facility] [address]     Depression     Edema of both legs     Elevated serum creatinine     Elevated serum glucose     HLD (hyperlipidemia)     Hypertensive heart disease without heart failure 4/12/2020    Morbid obesity ([facility] [address] 4/12/2020    Near syncope     Neuropathic pain 4/12/2020    SHARA treated with BiPAP     Persistent atrial fibrillation 6/49/7456    Umbilical hernia 5/59/8426     Chief Complaint for this Admission:    Chief Complaint   Patient presents with    Chest Pain     Prior to Admission Medications:   Prior to Admission Medications   Prescriptions Last Dose Informant Taking? amLODIPine (NORVASC) 10 mg tablet 4/11/2020 at Unknown time  Yes   Sig: Take 10 mg by mouth daily. atorvastatin (LIPITOR) 20 mg tablet 4/11/2020 at Unknown time  Yes   Sig: Take 20 mg by mouth daily. b complex vitamins tablet 4/11/2020 at Unknown time  Yes   Sig: Take 1 Tab by mouth daily.    citalopram (CELEXA) 20 mg tablet 4/11/2020 at Unknown time  Yes   Sig: Take 20 mg by mouth daily. fluticasone propionate (Flonase Allergy Relief) 50 mcg/actuation nasal spray 2020 at Unknown time  Yes   Si Sprays by Both Nostrils route daily. furosemide (LASIX) 20 mg tablet 2020 at Unknown time  Yes   Sig: Take 20 mg by mouth daily. Indications: visible water retention   gabapentin (NEURONTIN) 600 mg tablet 2020 at Unknown time  Yes   Sig: Take 600 mg by mouth nightly. lisinopriL (PRINIVIL, ZESTRIL) 20 mg tablet 2020 at Unknown time  Yes   Sig: Take 20 mg by mouth daily. loratadine-pseudoephedrine (Claritin-D 24 Hour)  mg per tablet 2020 at Unknown time  Yes   Sig: Take 1 Tab by mouth daily. lubiPROStone (Amitiza) 8 mcg capsule 2020 at Unknown time  Yes   Sig: Take 8 mcg by mouth two (2) times daily (with meals). Facility-Administered Medications: None       Please contact the main inpatient pharmacy with any questions or concerns at [phone] and we will direct you to the clinical pharmacist covering this patient's care while in-house.    Ben Chen, AVINASHD

## 2020-04-12 NOTE — ED PROVIDER NOTES
This patient presents with near syncopal episode x2 today. First episode was around 9 AM.  Second episode was within the past 30 to 40 minutes while he was at a [employer] That time he had central chest pain that lasted for 15 minutes. That is now gone. He had shortness of breath, nausea and profuse diaphoresis. EMS states that he was in atrial fibrillation for them. He has no history of this. He has a history of obesity. He does have history of exertional dyspnea chronically due to obesity. No recent fever, cough or cold symptoms. Right now he has left lower quadrant abdominal pain. Chest pain is gone. Received 4 baby ASA po by EMS. Chest Pain (Angina)           Past Medical History:   Diagnosis Date    Hypertension        Past Surgical History:   Procedure Laterality Date    HX HEENT      Jaw    HX ORTHOPAEDIC      Left Femur    HX ORTHOPAEDIC      Left Hip         History reviewed. No pertinent family history.     Social History     Socioeconomic History    Marital status: Not on file     Spouse name: Not on file    Number of children: Not on file    Years of education: Not on file    Highest education level: Not on file   Occupational History    Not on file   Social Needs    Financial resource strain: Not on file    Food insecurity     Worry: Not on file     Inability: Not on file    Transportation needs     Medical: Not on file     Non-medical: Not on file   Tobacco Use    Smoking status: Never Smoker    Smokeless tobacco: Never Used   Substance and Sexual Activity    Alcohol use: Yes     Comment: rarely    Drug use: Yes     Types: Marijuana    Sexual activity: Not on file   Lifestyle    Physical activity     Days per week: Not on file     Minutes per session: Not on file    Stress: Not on file   Relationships    Social connections     Talks on phone: Not on file     Gets together: Not on file     Attends Episcopal service: Not on file     Active member of club or organization: Not on file     Attends meetings of clubs or organizations: Not on file     Relationship status: Not on file    Intimate partner violence     Fear of current or ex partner: Not on file     Emotionally abused: Not on file     Physically abused: Not on file     Forced sexual activity: Not on file   Other Topics Concern    Not on file   Social History Narrative    Not on file         ALLERGIES: Patient has no known allergies. Review of Systems   Cardiovascular: Positive for chest pain. Vitals:    04/12/20 1235 04/12/20 1243 04/12/20 1245 04/12/20 1246   BP: (!) 129/102 (!) 125/93 122/79 115/89   Pulse: (!) 125 (!) 120 (!) 120 (!) 126   Resp: 22 23 20 27   Temp:       SpO2: 95% 95% 96% 93%   Weight:                Physical Exam  Constitutional:       Appearance: He is well-developed. He is obese. He is diaphoretic. HENT:      Head: Normocephalic. Mouth/Throat:      Mouth: Mucous membranes are moist.   Eyes:      Pupils: Pupils are equal, round, and reactive to light. Cardiovascular:      Rate and Rhythm: Regular rhythm. Tachycardia present. Pulses: Normal pulses. Pulmonary:      Effort: Pulmonary effort is normal.   Abdominal:      Palpations: Abdomen is soft. Tenderness: There is abdominal tenderness (mild LLQ). Musculoskeletal:      Right lower leg: No edema. Left lower leg: No edema. Skin:     Capillary Refill: Capillary refill takes less than 2 seconds. Comments: cool   Neurological:      General: No focal deficit present. Mental Status: He is alert. Psychiatric:         Mood and Affect: Mood normal.          MDM  Number of Diagnoses or Management Options  Critical Care  Total time providing critical care: 30-74 minutes       30 minutes      Procedures    EKG #1 interpreted by me:   Sinus tachycardia, PACs. Inferolateral ST elevation. There is artifact. Rate of 129. Normal axis.       EKG #2 interpreted by me:  Sinus tachycardia, regular, rate of 122.  ST elevation laterally. Normal axis. 12:41 PM - d/w Dr Magaly Alexis - will look at ekgs. Critical care truck has been called - 25 min away from us. 12:51 PM - d/w Dr Akanksha Stanton - he does not think it's a STEMI right now. He may be in atrial flutter. He will see him in the [facility] ED and reassess him. EKG on arrival at [facility]. Not going to cath lab right now. No CP at this time. Asks for gentle rate control. 12:55 PM - d/w Dr Mata Pena, [facility] ED.      1:00 PM - no chest pain. Report to [address] [place] [address] Nurse - will try some dilt on way to [facility]. Recent Results (from the past 12 hour(s))   EKG, 12 LEAD, INITIAL    Collection Time: 04/12/20 12:26 PM   Result Value Ref Range    Ventricular Rate 129 BPM    Atrial Rate 129 BPM    P-R Interval 160 ms    QRS Duration 96 ms    Q-T Interval 330 ms    QTC Calculation (Bezet) 483 ms    Calculated P Axis 45 degrees    Calculated R Axis 12 degrees    Calculated T Axis 27 degrees    Diagnosis       Sinus tachycardia with premature supraventricular complexes  ST elevation, consider inferolateral injury or acute infarct  ** ** ACUTE MI / STEMI ** **  Abnormal ECG  No previous ECGs available     CBC WITH AUTOMATED DIFF    Collection Time: 04/12/20 12:29 PM   Result Value Ref Range    WBC 9.6 4.1 - 11.1 K/uL    RBC 5.26 4.10 - 5.70 M/uL    HGB 15.8 12.1 - 17.0 g/dL    HCT 48.5 36.6 - 50.3 %    MCV 92.2 80.0 - 99.0 FL    MCH 30.0 26.0 - 34.0 PG    MCHC 32.6 30.0 - 36.5 g/dL    RDW 13.5 11.5 - 14.5 %    PLATELET 554 579 - 175 K/uL    MPV 9.9 8.9 - 12.9 FL    NRBC 0.0 0  WBC    ABSOLUTE NRBC 0.00 0.00 - 0.01 K/uL    NEUTROPHILS 57 32 - 75 %    LYMPHOCYTES 31 12 - 49 %    MONOCYTES 9 5 - 13 %    EOSINOPHILS 3 0 - 7 %    BASOPHILS 0 0 - 1 %    IMMATURE GRANULOCYTES 1 (H) 0.0 - 0.5 %    ABS. NEUTROPHILS 5.4 1.8 - 8.0 K/UL    ABS. LYMPHOCYTES 3.0 0.8 - 3.5 K/UL    ABS. MONOCYTES 0.8 0.0 - 1.0 K/UL    ABS. EOSINOPHILS 0.3 0.0 - 0.4 K/UL    ABS.  BASOPHILS 0.0 0.0 - 0.1 K/UL ABS. IMM. GRANS. 0.1 (H) 0.00 - 0.04 K/UL    DF AUTOMATED     METABOLIC PANEL, COMPREHENSIVE    Collection Time: 04/12/20 12:29 PM   Result Value Ref Range    Sodium 145 136 - 145 mmol/L    Potassium 4.0 3.5 - 5.1 mmol/L    Chloride 105 97 - 108 mmol/L    CO2 27 21 - 32 mmol/L    Anion gap 13 5 - 15 mmol/L    Glucose 120 (H) 65 - 100 mg/dL    BUN 19 6 - 20 MG/DL    Creatinine 1.43 (H) 0.70 - 1.30 MG/DL    BUN/Creatinine ratio 13 12 - 20      GFR est AA >60 >60 ml/min/1.73m2    GFR est non-AA 52 (L) >60 ml/min/1.73m2    Calcium 10.2 (H) 8.5 - 10.1 MG/DL    Bilirubin, total PENDING MG/DL    ALT (SGPT) PENDING U/L    AST (SGOT) PENDING U/L    Alk. phosphatase PENDING U/L    Protein, total PENDING g/dL    Albumin PENDING g/dL    Globulin PENDING g/dL    A-G Ratio PENDING     SAMPLES BEING HELD    Collection Time: 04/12/20 12:29 PM   Result Value Ref Range    SAMPLES BEING HELD 1BLU     COMMENT        Add-on orders for these samples will be processed based on acceptable specimen integrity and analyte stability, which may vary by analyte.    POC TROPONIN-I    Collection Time: 04/12/20 12:29 PM   Result Value Ref Range    Troponin-I (POC) <0.04 0.00 - 0.08 ng/mL

## 2020-04-12 NOTE — ED TRIAGE NOTES
Triage Note: Patient arrives by EMS from [facility] for chest pain that started while shopping. +SOB. EMS reports a-fib. EMS gave 324 of ASA. +sweating. Patient denies chest pain upon arrival. Patient reports he had a similar episode this morning, but symptoms resolved within minutes.

## 2020-04-12 NOTE — ED NOTES
Pt placed on 3L NC, pt resting in bed with eyes closed. Pt informed RN he has SHARA and wears CPAP at night.

## 2020-04-13 ENCOUNTER — APPOINTMENT (OUTPATIENT)
Dept: VASCULAR SURGERY | Age: 55
DRG: 175 | End: 2020-04-13
Attending: STUDENT IN AN ORGANIZED HEALTH CARE EDUCATION/TRAINING PROGRAM
Payer: MEDICAID

## 2020-04-13 LAB
ANION GAP SERPL CALC-SCNC: 6 MMOL/L (ref 5–15)
APTT PPP: 27.9 SEC (ref 22.1–32)
BASOPHILS # BLD: 0.1 K/UL (ref 0–0.1)
BASOPHILS NFR BLD: 1 % (ref 0–1)
BUN SERPL-MCNC: 21 MG/DL (ref 6–20)
BUN/CREAT SERPL: 16 (ref 12–20)
CALCIUM SERPL-MCNC: 10 MG/DL (ref 8.5–10.1)
CHLORIDE SERPL-SCNC: 105 MMOL/L (ref 97–108)
CO2 SERPL-SCNC: 27 MMOL/L (ref 21–32)
CREAT SERPL-MCNC: 1.3 MG/DL (ref 0.7–1.3)
DIFFERENTIAL METHOD BLD: ABNORMAL
EOSINOPHIL # BLD: 0.3 K/UL (ref 0–0.4)
EOSINOPHIL NFR BLD: 3 % (ref 0–7)
ERYTHROCYTE [DISTWIDTH] IN BLOOD BY AUTOMATED COUNT: 13.5 % (ref 11.5–14.5)
EST. AVERAGE GLUCOSE BLD GHB EST-MCNC: 128 MG/DL
GLUCOSE P FAST SERPL-MCNC: 102 MG/DL (ref 65–100)
GLUCOSE SERPL-MCNC: 102 MG/DL (ref 65–100)
HBA1C MFR BLD: 6.1 % (ref 4–5.6)
HCT VFR BLD AUTO: 44.1 % (ref 36.6–50.3)
HGB BLD-MCNC: 14.2 G/DL (ref 12.1–17)
IMM GRANULOCYTES # BLD AUTO: 0.1 K/UL (ref 0–0.04)
IMM GRANULOCYTES NFR BLD AUTO: 1 % (ref 0–0.5)
LYMPHOCYTES # BLD: 2.6 K/UL (ref 0.8–3.5)
LYMPHOCYTES NFR BLD: 26 % (ref 12–49)
MAGNESIUM SERPL-MCNC: 2.2 MG/DL (ref 1.6–2.4)
MCH RBC QN AUTO: 30.1 PG (ref 26–34)
MCHC RBC AUTO-ENTMCNC: 32.2 G/DL (ref 30–36.5)
MCV RBC AUTO: 93.6 FL (ref 80–99)
MONOCYTES # BLD: 0.9 K/UL (ref 0–1)
MONOCYTES NFR BLD: 9 % (ref 5–13)
NEUTS SEG # BLD: 6.1 K/UL (ref 1.8–8)
NEUTS SEG NFR BLD: 60 % (ref 32–75)
NRBC # BLD: 0 K/UL (ref 0–0.01)
NRBC BLD-RTO: 0 PER 100 WBC
PLATELET # BLD AUTO: 237 K/UL (ref 150–400)
PMV BLD AUTO: 9.9 FL (ref 8.9–12.9)
POTASSIUM SERPL-SCNC: 4.4 MMOL/L (ref 3.5–5.1)
RBC # BLD AUTO: 4.71 M/UL (ref 4.1–5.7)
SODIUM SERPL-SCNC: 138 MMOL/L (ref 136–145)
THERAPEUTIC RANGE,PTTT: NORMAL SECS (ref 58–77)
WBC # BLD AUTO: 10 K/UL (ref 4.1–11.1)

## 2020-04-13 PROCEDURE — 74011250636 HC RX REV CODE- 250/636: Performed by: NURSE PRACTITIONER

## 2020-04-13 PROCEDURE — 36415 COLL VENOUS BLD VENIPUNCTURE: CPT

## 2020-04-13 PROCEDURE — 74011250637 HC RX REV CODE- 250/637: Performed by: NURSE PRACTITIONER

## 2020-04-13 PROCEDURE — 94660 CPAP INITIATION&MGMT: CPT

## 2020-04-13 PROCEDURE — 74011250637 HC RX REV CODE- 250/637: Performed by: SPECIALIST

## 2020-04-13 PROCEDURE — 77010033678 HC OXYGEN DAILY

## 2020-04-13 PROCEDURE — 82947 ASSAY GLUCOSE BLOOD QUANT: CPT

## 2020-04-13 PROCEDURE — 85730 THROMBOPLASTIN TIME PARTIAL: CPT

## 2020-04-13 PROCEDURE — 80048 BASIC METABOLIC PNL TOTAL CA: CPT

## 2020-04-13 PROCEDURE — 83735 ASSAY OF MAGNESIUM: CPT

## 2020-04-13 PROCEDURE — 65660000001 HC RM ICU INTERMED STEPDOWN

## 2020-04-13 PROCEDURE — 74011250636 HC RX REV CODE- 250/636: Performed by: SPECIALIST

## 2020-04-13 PROCEDURE — 83036 HEMOGLOBIN GLYCOSYLATED A1C: CPT

## 2020-04-13 PROCEDURE — 74011250637 HC RX REV CODE- 250/637: Performed by: INTERNAL MEDICINE

## 2020-04-13 PROCEDURE — 84588 ASSAY OF VASOPRESSIN: CPT

## 2020-04-13 PROCEDURE — 93971 EXTREMITY STUDY: CPT

## 2020-04-13 PROCEDURE — 85025 COMPLETE CBC W/AUTO DIFF WBC: CPT

## 2020-04-13 RX ORDER — HEPARIN SODIUM 10000 [USP'U]/100ML
4-25 INJECTION, SOLUTION INTRAVENOUS
Status: DISCONTINUED | OUTPATIENT
Start: 2020-04-13 | End: 2020-04-13

## 2020-04-13 RX ADMIN — Medication 10 ML: at 21:09

## 2020-04-13 RX ADMIN — METOPROLOL TARTRATE 50 MG: 50 TABLET, FILM COATED ORAL at 13:54

## 2020-04-13 RX ADMIN — Medication 10 ML: at 09:09

## 2020-04-13 RX ADMIN — LUBIPROSTONE 8 MCG: 8 CAPSULE, GELATIN COATED ORAL at 09:10

## 2020-04-13 RX ADMIN — AMIODARONE HYDROCHLORIDE 400 MG: 200 TABLET ORAL at 09:10

## 2020-04-13 RX ADMIN — CITALOPRAM HYDROBROMIDE 20 MG: 20 TABLET ORAL at 09:10

## 2020-04-13 RX ADMIN — LUBIPROSTONE 8 MCG: 8 CAPSULE, GELATIN COATED ORAL at 16:05

## 2020-04-13 RX ADMIN — DIGOXIN 250 MCG: 0.25 INJECTION INTRAMUSCULAR; INTRAVENOUS at 04:08

## 2020-04-13 RX ADMIN — DIGOXIN 250 MCG: 0.25 INJECTION INTRAMUSCULAR; INTRAVENOUS at 09:17

## 2020-04-13 RX ADMIN — METOPROLOL TARTRATE 50 MG: 50 TABLET, FILM COATED ORAL at 21:08

## 2020-04-13 RX ADMIN — AMIODARONE HYDROCHLORIDE 400 MG: 200 TABLET ORAL at 16:04

## 2020-04-13 RX ADMIN — GABAPENTIN 600 MG: 600 TABLET, FILM COATED ORAL at 21:08

## 2020-04-13 RX ADMIN — HEPARIN SODIUM 5000 UNITS: 5000 INJECTION INTRAVENOUS; SUBCUTANEOUS at 03:43

## 2020-04-13 RX ADMIN — Medication 10 ML: at 13:54

## 2020-04-13 RX ADMIN — RIVAROXABAN 20 MG: 20 TABLET, FILM COATED ORAL at 17:14

## 2020-04-13 RX ADMIN — ATORVASTATIN CALCIUM 20 MG: 20 TABLET, FILM COATED ORAL at 09:10

## 2020-04-13 RX ADMIN — METOPROLOL TARTRATE 50 MG: 50 TABLET, FILM COATED ORAL at 06:12

## 2020-04-13 RX ADMIN — AMIODARONE HYDROCHLORIDE 400 MG: 200 TABLET ORAL at 21:08

## 2020-04-13 NOTE — PROGRESS NOTES
Spiritual Care Assessment/Progress Note  [facility]      NAME: Jaxson Willis      MRN: [id number]  AGE: 49 y.o.  SEX: male  Sabianist Affiliation: Lutheran   Language: English     4/13/2020     Total Time (in minutes): 60     Spiritual Assessment begun in [facility] CV SERVICES UNIT through conversation with:         [x]Patient        [] Family    [] Friend(s)        Reason for Consult: Initial/Spiritual assessment, patient floor, Advance medical directive consult     Spiritual beliefs: (Please include comment if needed)     [x] Identifies with a chyna tradition: Lutheran        [] Supported by a chyna community:            [] Claims no spiritual orientation:           [] Seeking spiritual identity:                [] Adheres to an individual form of spirituality:           [] Not able to assess:                           Identified resources for coping:      [] Prayer                               [] Music                  [] Guided Imagery     [x] Family/friends                 [] Pet visits     [] Devotional reading                         [] Unknown     [] Other:                                             Interventions offered during this visit: (See comments for more details)    Patient Interventions: Advance medical directive consult, Affirmation of emotions/emotional suffering, Affirmation of chyna, Catharsis/review of pertinent events in supportive environment, Coping skills reviewed/reinforced, End of life issues discussed, Iconic (affirming the presence of God/Higher Power)           Plan of Care:     [] Support spiritual and/or cultural needs    [x] Support AMD and/or advance care planning process      [] Support grieving process   [] Coordinate Rites and/or Rituals    [] Coordination with community clergy   [] No spiritual needs identified at this time   [] Detailed Plan of Care below (See Comments)  [] Make referral to Music Therapy  [] Make referral to Pet Therapy     [] Make referral to Addiction services  [] Make referral to [facility]  [] Make referral to Spiritual Care Partner  [] No future visits requested        [x] Follow up visits as needed     Comments:  visit for initial spiritual assessment and Advance Directive (AMD) consult. Patient sitting up in bed, good eye contact, friendly. Says he is feeling better. Provided spiritual presence and listening as he spoke of his present thoughts, feelings, and concerns. Spoke about his health and the events leading to this hospitalization. Also spoke about how this event has changed his perspective on his health and life in general.  Described his chyna journey saying he has not attended regular Mandaeism services since high school, but has chyna and trust in [id number] and sees God's gracious and merciful hand in his life and sees his life in terms of blessing as a result. Patient is single and is currently taking care of his father. His mother became ill in 2011 and passed away shortly after while he was caring for her. He described good family support from very close family relationships and is currently caring for his nephews after his brother passed away. He also has a sister who passed away.  requested for Advance Directive (AMD) consult. Patient requested completion of AMD and received information concerning AMD, a copy of the booklet, \"Your Right to Decide,\" and answered all of his questions concerning AMD.  He was able to complete most of the form, but did not have all of the contact information required concerning his primary and secondary agent. Patient stated he would get this information as soon as able and would have his nurse contact the  when he was able to complete this form. Informed him of availability of  and pastoral care services. Visited by Rev. Yancy Finnegan MDiv, [facility], [facility]   paging service: [phone])

## 2020-04-13 NOTE — PROGRESS NOTES
Cardiology Progress Note            Admit Date: 4/12/2020  Admit Diagnosis: Chest pain [R07.9]  Atrial fibrillation and flutter ([address] [I48.91, I48.92]  Date: 4/13/2020     Time: 8:58 AM    Subjective:  Feeling well. No C/o CP or SOB. Flutter on Telemetry. Rate around 90     Assessment and Plan     1. Afib/aflutter   - new onset   - Hep SC changed to Hep gtt   - rate controlled on Amio PO load, Lopressor 50 mg BID   - TSH NML  2. Morbid obesity   Weight 454 pounds   He was to have gastric bypass - postponed 2/2 pandemic  3. SHARA/OHV   - BIPAP at home  4. HTN   - Norvasc, Lisinopril PTA    AFlutter on telemetry. Didn't medically CV on meds, but rate controlled. Consulted Dr. Rhea Cherry for opinion on CV vs Ablation. Cardiology Attending:Patient seen and examined. I agree with NP assessment and plans. Agree with ablation if possible, otherwise will consider cardioversion. Angelica Puga MD 4/13/2020 12:01 PM         No results found for: CADE   Past Medical History:   Diagnosis Date    Atrial flutter ([facility]     Depression     Edema of both legs     Elevated serum creatinine     Elevated serum glucose     HLD (hyperlipidemia)     Hypertensive heart disease without heart failure 4/12/2020    Morbid obesity ([address] 4/12/2020    Near syncope     Neuropathic pain 4/12/2020    SHARA treated with BiPAP     Persistent atrial fibrillation 7/85/6004    Umbilical hernia 0/77/7436      Social History     Tobacco Use    Smoking status: Never Smoker    Smokeless tobacco: Never Used   Substance Use Topics    Alcohol use: Not Currently     Comment: rarely    Drug use: Yes     Types: Marijuana     Comment: occasional           Review of Systems:    [] Patient unable to provide secondary to condition    [x] All systems negative, except as checked below.   Constitutional:    []Weight Change  []Fever   []Chills   []Night Sweats  []Fatigue  []Malaise []____  ENT/Mouth:     []Hearing Changes  []Ear Pain  []Nasal Congestion   []Sinus Pain  []Hoarseness   []Sore throat  []Rhinorrhea  []Swallowing Difficulty  []____  Eyes:    []Eye Pain  []Swelling  []Redness  []Foreign Body  []Discharge  []Vision Changes  []____  Cardiovascular:    []Chest Pain  []SOB  []PND  []GRIER  []Orthopnea  []Claudication  []Edema   []Palpitations  []____  Respiratory:    []Cough  []Sputum  []Wheezing,  []SOB  []Hemoptysis  []____  Gastrointestinal:    []Nausea  []Vomiting  []Diarrhea  []Constipation  []Pain  []Heartburn  []Anorexia  []Dysphagia  []Hematochezia  []Melena,  []Jaundice  []____  Genitourinary:    []Dysuria  []Urinary Frequency  []Hematuria  []Urinary Incontinence  []Urgency  []Flank Pain  []Hesitancy  []____  Musculoskeletal:    []Arthralgias  []Myalgias  []Joint Swelling  []Joint Stiffness  []Back Pain  []Neck Pain  []____  Skin:    []Skin Lesions  []Pruritis  []Hair Changes  []Skin rashes  []____  Neuro:    []Weakness  []Numbness  []Paresthesias  []Loss of Consciousness  []Syncope   []Dizziness  []Headache  []Coordination Changes  []Recent Falls  []____  Psych:    []Anxiety/Depression  []Insomnia  []Memory Changes  []Violence/Abuse Hx.  []____  Heme/Lymph:    []Bruising  []Bleeding  []Lymphadenopathy  []____  Endocrine:    []Polyuria  []Polydipsia  []Temperature Intolerance  []____         Objective:     Physical Exam:                Visit Vitals  BP (!) 120/98 (BP 1 Location: Right arm, BP Patient Position: At rest)   Pulse 77   Temp 97.9 °F (36.6 °C)   Resp 18   Wt (!) 454 lb 2.4 oz (206 kg)   SpO2 94%        General Appearance:   Well developed, well nourished,alert and oriented x 3, and   individual in no acute distress. Ears/Nose/Mouth/Throat:    Hearing grossly normal.         Neck:  Supple. Chest:    Lungs clear to auscultation bilaterally. Cardiovascular:   Irregular rate and rhythm, S1, S2 normal, no murmur.    Abdomen:    Soft, non-tender, bowel sounds are active. Extremities:  No edema bilaterally. Skin:  Warm and dry. Telemetry: aflutter     Data Review:    Labs:    Recent Results (from the past 24 hour(s))   EKG, 12 LEAD, INITIAL    Collection Time: 04/12/20 12:26 PM   Result Value Ref Range    Ventricular Rate 122 BPM    Atrial Rate 122 BPM    P-R Interval 182 ms    QRS Duration 98 ms    Q-T Interval 424 ms    QTC Calculation (Bezet) 604 ms    Calculated P Axis 39 degrees    Calculated R Axis 10 degrees    Calculated T Axis 34 degrees    Diagnosis       Possible Atrial flutter with 2 to 1 block  ST elevation, consider inferolateral injury or acute infarct  Nonspecific ST segment changes  Abnormal ECG  No previous ECGs available  Confirmed by Christina Smith MD, [employer] ([id number]) on 4/12/2020 2:49:18 PM     TSH 3RD GENERATION    Collection Time: 04/12/20 12:29 PM   Result Value Ref Range    TSH 0.75 0.36 - 3.74 uIU/mL   CBC WITH AUTOMATED DIFF    Collection Time: 04/12/20 12:29 PM   Result Value Ref Range    WBC 9.6 4.1 - 11.1 K/uL    RBC 5.26 4.10 - 5.70 M/uL    HGB 15.8 12.1 - 17.0 g/dL    HCT 48.5 36.6 - 50.3 %    MCV 92.2 80.0 - 99.0 FL    MCH 30.0 26.0 - 34.0 PG    MCHC 32.6 30.0 - 36.5 g/dL    RDW 13.5 11.5 - 14.5 %    PLATELET 128 521 - 269 K/uL    MPV 9.9 8.9 - 12.9 FL    NRBC 0.0 0  WBC    ABSOLUTE NRBC 0.00 0.00 - 0.01 K/uL    NEUTROPHILS 57 32 - 75 %    LYMPHOCYTES 31 12 - 49 %    MONOCYTES 9 5 - 13 %    EOSINOPHILS 3 0 - 7 %    BASOPHILS 0 0 - 1 %    IMMATURE GRANULOCYTES 1 (H) 0.0 - 0.5 %    ABS. NEUTROPHILS 5.4 1.8 - 8.0 K/UL    ABS. LYMPHOCYTES 3.0 0.8 - 3.5 K/UL    ABS. MONOCYTES 0.8 0.0 - 1.0 K/UL    ABS. EOSINOPHILS 0.3 0.0 - 0.4 K/UL    ABS. BASOPHILS 0.0 0.0 - 0.1 K/UL    ABS. IMM.  GRANS. 0.1 (H) 0.00 - 0.04 K/UL    DF AUTOMATED     MAGNESIUM    Collection Time: 04/12/20 12:29 PM   Result Value Ref Range    Magnesium 1.9 1.6 - 2.4 mg/dL   METABOLIC PANEL, COMPREHENSIVE    Collection Time: 04/12/20 12:29 PM   Result Value Ref Range    Sodium 145 136 - 145 mmol/L    Potassium 4.0 3.5 - 5.1 mmol/L    Chloride 105 97 - 108 mmol/L    CO2 27 21 - 32 mmol/L    Anion gap 13 5 - 15 mmol/L    Glucose 120 (H) 65 - 100 mg/dL    BUN 19 6 - 20 MG/DL    Creatinine 1.43 (H) 0.70 - 1.30 MG/DL    BUN/Creatinine ratio 13 12 - 20      GFR est AA >60 >60 ml/min/1.73m2    GFR est non-AA 52 (L) >60 ml/min/1.73m2    Calcium 10.2 (H) 8.5 - 10.1 MG/DL    Bilirubin, total 0.4 0.2 - 1.0 MG/DL    ALT (SGPT) 38 12 - 78 U/L    AST (SGOT) 18 15 - 37 U/L    Alk. phosphatase 111 45 - 117 U/L    Protein, total 9.0 (H) 6.4 - 8.2 g/dL    Albumin 4.2 3.5 - 5.0 g/dL    Globulin 4.8 (H) 2.0 - 4.0 g/dL    A-G Ratio 0.9 (L) 1.1 - 2.2     TROPONIN I    Collection Time: 04/12/20 12:29 PM   Result Value Ref Range    Troponin-I, Qt. <0.05 <0.05 ng/mL   SAMPLES BEING HELD    Collection Time: 04/12/20 12:29 PM   Result Value Ref Range    SAMPLES BEING HELD 1BLU     COMMENT        Add-on orders for these samples will be processed based on acceptable specimen integrity and analyte stability, which may vary by analyte.    POC TROPONIN-I    Collection Time: 04/12/20 12:29 PM   Result Value Ref Range    Troponin-I (POC) <0.04 0.00 - 0.08 ng/mL   EKG, 12 LEAD, INITIAL    Collection Time: 04/12/20  1:29 PM   Result Value Ref Range    Ventricular Rate 121 BPM    Atrial Rate 242 BPM    QRS Duration 82 ms    Q-T Interval 418 ms    QTC Calculation (Bezet) 593 ms    Calculated R Axis 30 degrees    Calculated T Axis 69 degrees    Diagnosis       Atrial flutter  ST elevation, consider inferior injury or acute infarct    When compared with ECG of 12-APR-2020 12:33,  MANUAL COMPARISON REQUIRED, DATA IS UNCONFIRMED  Confirmed by Barrett Litten, MD, [facility] Sy ([id number]) on 4/12/2020 2:50:52 PM     D DIMER    Collection Time: 04/12/20  1:47 PM   Result Value Ref Range    D-dimer 0.48 0.00 - 0.65 mg/L FEU   METABOLIC PANEL, BASIC    Collection Time: 04/13/20  6:14 AM   Result Value Ref Range    Sodium 138 136 - 145 mmol/L    Potassium 4.4 3.5 - 5.1 mmol/L    Chloride 105 97 - 108 mmol/L    CO2 27 21 - 32 mmol/L    Anion gap 6 5 - 15 mmol/L    Glucose 102 (H) 65 - 100 mg/dL    BUN 21 (H) 6 - 20 MG/DL    Creatinine 1.30 0.70 - 1.30 MG/DL    BUN/Creatinine ratio 16 12 - 20      GFR est AA >60 >60 ml/min/1.73m2    GFR est non-AA 58 (L) >60 ml/min/1.73m2    Calcium 10.0 8.5 - 10.1 MG/DL   MAGNESIUM    Collection Time: 04/13/20  6:14 AM   Result Value Ref Range    Magnesium 2.2 1.6 - 2.4 mg/dL   CBC WITH AUTOMATED DIFF    Collection Time: 04/13/20  6:14 AM   Result Value Ref Range    WBC 10.0 4.1 - 11.1 K/uL    RBC 4.71 4.10 - 5.70 M/uL    HGB 14.2 12.1 - 17.0 g/dL    HCT 44.1 36.6 - 50.3 %    MCV 93.6 80.0 - 99.0 FL    MCH 30.1 26.0 - 34.0 PG    MCHC 32.2 30.0 - 36.5 g/dL    RDW 13.5 11.5 - 14.5 %    PLATELET 345 343 - 031 K/uL    MPV 9.9 8.9 - 12.9 FL    NRBC 0.0 0  WBC    ABSOLUTE NRBC 0.00 0.00 - 0.01 K/uL    NEUTROPHILS 60 32 - 75 %    LYMPHOCYTES 26 12 - 49 %    MONOCYTES 9 5 - 13 %    EOSINOPHILS 3 0 - 7 %    BASOPHILS 1 0 - 1 %    IMMATURE GRANULOCYTES 1 (H) 0.0 - 0.5 %    ABS. NEUTROPHILS 6.1 1.8 - 8.0 K/UL    ABS. LYMPHOCYTES 2.6 0.8 - 3.5 K/UL    ABS. MONOCYTES 0.9 0.0 - 1.0 K/UL    ABS. EOSINOPHILS 0.3 0.0 - 0.4 K/UL    ABS. BASOPHILS 0.1 0.0 - 0.1 K/UL    ABS. IMM.  GRANS. 0.1 (H) 0.00 - 0.04 K/UL    DF AUTOMATED     HEMOGLOBIN A1C WITH EAG    Collection Time: 04/13/20  6:14 AM   Result Value Ref Range    Hemoglobin A1c 6.1 (H) 4.0 - 5.6 %    Est. average glucose 128 mg/dL   GLUCOSE, FASTING    Collection Time: 04/13/20  6:14 AM   Result Value Ref Range    Glucose 102 (H) 65 - 100 MG/DL          Radiology:        Current Facility-Administered Medications   Medication Dose Route Frequency    digoxin (LANOXIN) injection 250 mcg  250 mcg IntraVENous Q6H    amiodarone (CORDARONE) tablet 400 mg  400 mg Oral TID    dilTIAZem (CARDIZEM) 125 mg in dextrose 5% 125 mL infusion  0-15 mg/hr IntraVENous TITRATE    metoprolol tartrate (LOPRESSOR) tablet 50 mg  50 mg Oral Q8H    acetaminophen (TYLENOL) tablet 650 mg  650 mg Oral Q4H PRN    atorvastatin (LIPITOR) tablet 20 mg  20 mg Oral DAILY    citalopram (CELEXA) tablet 20 mg  20 mg Oral DAILY    lubiPROStone (AMITIZA) capsule 8 mcg  8 mcg Oral BID WITH MEALS    sodium chloride (NS) flush 5-40 mL  5-40 mL IntraVENous Q8H    sodium chloride (NS) flush 5-40 mL  5-40 mL IntraVENous PRN    heparin (porcine) injection 5,000 Units  5,000 Units SubCUTAneous Q8H    ondansetron (ZOFRAN) injection 4 mg  4 mg IntraVENous Q4H PRN    gabapentin (NEURONTIN) tablet 600 mg  600 mg Oral QHS       Isaias Fermin MD     [facility] [address] [place] [address] [address]   [facility]   [phone]

## 2020-04-13 NOTE — PROGRESS NOTES
Patient is seen  Full note to follow  He will call [facility] about ablation and victoriano  I check with EP staffs on weight limit for ablation table

## 2020-04-13 NOTE — CONSULTS
Cardiac Electrophysiology Hospital Consultation Note     Subjective:      Jaxson Willis is a 49 y.o. patient who is seen for evaluation of typical atrial flutter with rapid ventricular rate. Dr Tam Escamilla kindly asked me to see him  He was admitted on 04/12/2020 with chest pain, diaphoresis, & near syncope x 1 day. Also with associated palpitations & SOB. This was the 3rd episode of near syncope with palpitations; 1st episode occurred approx 6 months ago. No prior evaluation. CXR normal on date of admission. ECG in ER showed 2:1 atrial flutter with RVR (122 bpm). Troponin normal, elevated Cr & Hgb A1c (6.1). D-dimer normal.      Diltiazem IV gtt initiated, then discontinued at 1900 on 04/12/2020. Amiodarone 400 mg po tid initiated on 04/12/2020, also with digoxin 250 mcg IV q 6 hrs & metoprolol 50 mg po q 8 hrs. He continues with atrial flutter today     [id number] 1 (HTN), but prediabetic per Hgb A1c. Received single dose Lovenox on date of admission, has not been started on [facility]. Morbidly obese, reportedly considering gastric bypass but is delayed due COVID 19 elective surgery postponement      Limited echo (04/12/2020): LVEF 65-70%, no RWMA, severe concentric LVH. Previous:  SHARA on BiPAP. Bilat lower extremity edema with (reportedly) related neuropathy. On gabapentin.       Problem List  Date Reviewed: 4/12/2020          Codes Class Noted    Persistent atrial fibrillation ICD-10-CM: I48.19  ICD-9-CM: 427.31  4/12/2020        * (Principal) Atrial flutter ([facility] [address] ICD-10-CM: I48.92  ICD-9-CM: 427.32  4/12/2020        Morbid obesity ([facility] [address] ICD-10-CM: E66.01  ICD-9-CM: 278.01  3/36/6280        Umbilical hernia LFJ-24-EX: K42.9  ICD-9-CM: 553.1  4/12/2020        Hypertensive heart disease without heart failure ICD-10-CM: I11.9  ICD-9-CM: 402.90  4/12/2020        Neuropathic pain ICD-10-CM: M79.2  ICD-9-CM: 729.2  4/12/2020        Chest pain ICD-10-CM: R07.9  ICD-9-CM: 786.50  4/12/2020 HLD (hyperlipidemia) ICD-10-CM: E78.5  ICD-9-CM: 272.4  Unknown        SHARA treated with BiPAP ICD-10-CM: G47.33  ICD-9-CM: 327.23  Unknown        Near syncope ICD-10-CM: R55  ICD-9-CM: 780.2  Unknown        Depression ICD-10-CM: F32.9  ICD-9-CM: 116  Unknown        Edema of both legs ICD-10-CM: R60.0  ICD-9-CM: 443. 3  Unknown        Elevated serum glucose ICD-10-CM: R73.9  ICD-9-CM: 790.29  Unknown        Elevated serum creatinine ICD-10-CM: R79.89  ICD-9-CM: 790.99  Unknown        Atrial fibrillation and flutter (HCC) ICD-10-CM: I48.91, I48.92  ICD-9-CM: 427.31, 427.32  4/12/2020              Current Facility-Administered Medications   Medication Dose Route Frequency Provider Last Rate Last Dose    heparin 25,000 units in D5W 250 ml infusion  4-25 Units/kg/hr IntraVENous TITRATE Isela Worthy PA-C        digoxin (LANOXIN) injection 250 mcg  250 mcg IntraVENous Q6H Lauar Song MD   250 mcg at 04/13/20 2456    amiodarone (CORDARONE) tablet 400 mg  400 mg Oral TID Laura oSng MD   400 mg at 04/13/20 0910    dilTIAZem (CARDIZEM) 125 mg in dextrose 5% 125 mL infusion  0-15 mg/hr IntraVENous TITRATE Lenard Larsen MD   Stopped at 04/12/20 1923    metoprolol tartrate (LOPRESSOR) tablet 50 mg  50 mg Oral Q8H Laura Song MD   50 mg at 04/13/20 0612    acetaminophen (TYLENOL) tablet 650 mg  650 mg Oral Q4H PRN Kelly Cee NP        atorvastatin (LIPITOR) tablet 20 mg  20 mg Oral DAILY Kelly Cee NP   20 mg at 04/13/20 0910    citalopram (CELEXA) tablet 20 mg  20 mg Oral DAILY Kelly Cee NP   20 mg at 04/13/20 6414    lubiPROStone (AMITIZA) capsule 8 mcg  8 mcg Oral BID WITH MEALS Kelly Cee NP   8 mcg at 04/13/20 0910    sodium chloride (NS) flush 5-40 mL  5-40 mL IntraVENous Q8H Kelly Cee NP   10 mL at 04/13/20 0909    sodium chloride (NS) flush 5-40 mL  5-40 mL IntraVENous PRN Kelly Cee NP        ondansetron [facility] injection 4 mg  4 mg IntraVENous Q4H PRN Tiffany Chandra NP        gabapentin (NEURONTIN) tablet 600 mg  600 mg Oral QHS Tiffany Chandra NP   600 mg at 04/12/20 2225     No Known Allergies  Past Medical History:   Diagnosis Date    Atrial flutter ([address]     Depression     Edema of both legs     Elevated serum creatinine     Elevated serum glucose     HLD (hyperlipidemia)     Hypertensive heart disease without heart failure 4/12/2020    Morbid obesity ([address] 4/12/2020    Near syncope     Neuropathic pain 4/12/2020    SHARA treated with BiPAP     Persistent atrial fibrillation 2/75/4316    Umbilical hernia 4/25/3622     Past Surgical History:   Procedure Laterality Date    HX HEENT      Jaw    HX ORTHOPAEDIC      Left Femur    HX ORTHOPAEDIC      Left Hip     Family History   Problem Relation Age of Onset    Diabetes Mother     Schizophrenia Mother     Diabetes Father     Prostate Cancer Father     Kidney Disease Father         ESRD ON HD    Diabetes Sister     Diabetes Brother     Kidney Disease Brother         ESRD ON HD     Social History     Tobacco Use    Smoking status: Never Smoker    Smokeless tobacco: Never Used   Substance Use Topics    Alcohol use: Not Currently     Comment: rarely        Review of Systems:   Constitutional: Negative for fever, chills, weight loss, + malaise/fatigue. HEENT: Negative for nosebleeds, vision changes. Respiratory: Negative for cough, hemoptysis  Cardiovascular: + recent chest pain, + palpitations, no orthopnea, claudication, + chronic leg swelling, + near syncope, and no PND. Gastrointestinal: Negative for nausea, vomiting, diarrhea, blood in stool and melena. Genitourinary: Negative for dysuria, and hematuria. Musculoskeletal: Negative for myalgias, arthralgia. Skin: Negative for rash. Heme: Does not bleed or bruise easily. Neurological: Negative for speech change and focal weakness.   + bilat lower extremity neuropathy  Endocrine: Polyphagia, polydipsia. Objective:     Visit Vitals  BP (!) 120/98 (BP 1 Location: Right arm, BP Patient Position: At rest)   Pulse 77   Temp 97.9 °F (36.6 °C)   Resp 18   Wt (!) 454 lb 2.4 oz (206 kg)   SpO2 94%      Physical Exam:   Constitutional: Well-developed and well-nourished. No respiratory distress. Head: Normocephalic and atraumatic. Eyes: Pupils are equal, round  ENT: Hearing grossly normal  Neck: Supple. No JVD present. Cardiovascular: this morning it was faster rate, regular rhythm. Exam reveals no gallop and no friction rub. No murmur heard. Pulmonary/Chest: Effort normal and breath sounds normal. No wheezes. Abdominal: Morbidly obese. Musculoskeletal: Trace bilat lower extremity edema. Neurological: Alert,oriented. Skin: Skin is warm and dry  Psychiatric: Normal mood and affect. Behavior is normal. Judgment and thought content normal.      EKG (04/12/2020): 2:1 AFL with RVR (122 bpm). Assessment/Plan:      Mr. Terrance Turcios was admitted with 2:1 AFL with RVR  This current episode began a few hours prior to admission. Similar previous episodes over the past 6-12 months. He is given many medications so far  His echo reported normal LVEF and severe LVH so amiodarone is the most suitable antiarrhythmic drug to use  Addendum from EP attending:   I have seen, examined patient, and discussed with nurse practitioner, registered nurse, reviewed, updated note and agree with the assessment and plan    I have talked to him this am.   Vital signs are stable  Exam shows regular rhythm and fast rate  Morbidly obese  Assessment and Plan:  He has been on all the available drugs amiodarone, metoprolol, cardizem and digoxin.  His ventricular rate was still 130 bpm when I saw him and he has typical atrial flutter  He was significantly symptomatic with fast rate  I discussed with him rhythm maintenance by cardioversion vs ablation   SUMANTH is avoided due to risk of COVID at this time  His weight is ok but close to 500 lb limit on the EP table  Will use intracardiac echo to rule out BENJI thrombus and vascular ultrasound for femoral access guidance  He will call his insurance [facility] to see if he can have ablation done here  His procedure is tentatively set for 11 am tomorrow        Thank you for involving me in this patient's care and please call with further concerns or questions. Ashley Borrero M.D.   Electrophysiology/Cardiology  [facility] [place]  [address] [place] [address]  [phone]

## 2020-04-13 NOTE — PROGRESS NOTES
0730: Bedside and Verbal shift change report given to [place] (oncoming nurse) by Angela Edgar RN (offgoing nurse). Report included the following information SBAR, Kardex, Intake/Output, MAR, Accordion, Recent Results and Cardiac Rhythm a-flutter. 1130: Dr. Beck Pittman at bedside, no heparin gtt for now. Will order PO xarelto    1930: Bedside and Verbal shift change report given to Angela Edgar RN (oncoming nurse) by FCO Miranda (offgoing nurse). Report included the following information SBAR, Kardex, Intake/Output, MAR, Accordion, Recent Results and Cardiac Rhythm a-flutter. Problem: Afib Pathway: Day 1  Goal: Activity/Safety  Outcome: Progressing Towards Goal  Goal: Consults, if ordered  Outcome: Progressing Towards Goal  Goal: Diagnostic Test/Procedures  Outcome: Progressing Towards Goal  Goal: Nutrition/Diet  Outcome: Progressing Towards Goal  Goal: Discharge Planning  Outcome: Progressing Towards Goal  Goal: Medications  Outcome: Progressing Towards Goal  Goal: Respiratory  Outcome: Progressing Towards Goal  Goal: Treatments/Interventions/Procedures  Outcome: Progressing Towards Goal  Goal: Psychosocial  Outcome: Progressing Towards Goal  Goal: *Optimal pain control at patient's stated goal  Outcome: Progressing Towards Goal  Goal: *Hemodynamically stable  Outcome: Progressing Towards Goal  Goal: *Stable cardiac rhythm  Outcome: Progressing Towards Goal  Goal: *Lungs clear or at baseline  Outcome: Progressing Towards Goal  Goal: *Labs within defined limits  Outcome: Progressing Towards Goal  Goal: *Describes available resources and support systems  Outcome: Progressing Towards Goal     Problem: Pressure Injury - Risk of  Goal: *Prevention of pressure injury  Description: Document Jerry Scale and appropriate interventions in the flowsheet.   Outcome: Progressing Towards Goal  Note: Pressure Injury Interventions:  Sensory Interventions: Assess changes in LOC, Keep linens dry and wrinkle-free, Maintain/enhance activity level, Minimize linen layers         Activity Interventions: Increase time out of bed, Pressure redistribution bed/mattress(bed type)    Mobility Interventions: Assess need for specialty bed, Pressure redistribution bed/mattress (bed type)    Nutrition Interventions: Document food/fluid/supplement intake    Friction and Shear Interventions: Lift sheet, Minimize layers                Problem: Patient Education: Go to Patient Education Activity  Goal: Patient/Family Education  Outcome: Progressing Towards Goal     Problem: Falls - Risk of  Goal: *Absence of Falls  Description: Document Edilma Fall Risk and appropriate interventions in the flowsheet.   Outcome: Progressing Towards Goal  Note: Fall Risk Interventions:            Medication Interventions: Evaluate medications/consider consulting pharmacy, Patient to call before getting OOB         History of Falls Interventions: Consult care management for discharge planning, Door open when patient unattended, Evaluate medications/consider consulting pharmacy         Problem: Patient Education: Go to Patient Education Activity  Goal: Patient/Family Education  Outcome: Progressing Towards Goal

## 2020-04-13 NOTE — PROGRESS NOTES
1930: Bedside and Verbal shift change report given to Keila Montalvo, RN (oncoming nurse) by Cristi Son RN (offgoing nurse). Report included the following information SBAR, Kardex, ED Summary, Intake/Output, MAR, Recent Results and Cardiac Rhythm atrial flutter. 2130: Paged hospitalist regarding patient's heart rate and whether to administer scheduled digoxin. 2145Krystal Sean with Ida Noonan NP. Orders received to hold digoxin. 2330: Patient HR - 65, BP - 139/84.     0200: Patient HR fluctuating between 50-60s. Initally held sherry,. Dig.     4645: Patient now in 2:1 a. Flutter, with HR - 120s.     0400: Administered IV digoxin. 0500: Patient HR between 80-120s resting. 6006: Administered scheduled metoprolol. 9569: Paged hospitalist regarding patient HR. Orders received to continue to hold cardizem gtt.     0800: Bedside and Verbal shift change report given to Ralph Baires [facility] (oncoming nurse) by Keila Montalvo RN (offgoing nurse). Report included the following information SBAR, Kardex, ED Summary, Intake/Output, MAR, Recent Results and Cardiac Rhythm Atrial flutter.

## 2020-04-13 NOTE — PROGRESS NOTES
1645: TRANSFER - IN REPORT:    Verbal report received from [facility], [address] [facility], (name) on Cass Prieto  being received from ED (unit) for routine progression of care      Report consisted of patients Situation, Background, Assessment and   Recommendations(SBAR). Information from the following report(s) SBAR, Kardex, Intake/Output, MAR, Accordion, Recent Results and Cardiac Rhythm a flutter was reviewed with the receiving nurse. Opportunity for questions and clarification was provided. Assessment completed upon patients arrival to unit and care assumed. 1930: Bedside shift change report given to [facility], RN, (oncoming nurse) by Cristi Son RN, (offgoing nurse). Report included the following information SBAR, Intake/Output, MAR, Accordion, Recent Results and Cardiac Rhythm a flutter. Problem: Afib Pathway: Day 1  Goal: Nutrition/Diet  Outcome: Progressing Towards Goal  Goal: Medications  Outcome: Progressing Towards Goal  Goal: Respiratory  Outcome: Progressing Towards Goal  Goal: *Hemodynamically stable  Outcome: Progressing Towards Goal  Goal: *Lungs clear or at baseline  Outcome: Progressing Towards Goal     Problem: Pressure Injury - Risk of  Goal: *Prevention of pressure injury  Description: Document Jerry Scale and appropriate interventions in the flowsheet.   Outcome: Progressing Towards Goal  Note: Pressure Injury Interventions:  Sensory Interventions: Minimize linen layers, Maintain/enhance activity level, Keep linens dry and wrinkle-free         Activity Interventions: Increase time out of bed    Mobility Interventions: PT/OT evaluation    Nutrition Interventions: Document food/fluid/supplement intake    Friction and Shear Interventions: Lift sheet, Minimize layers                Problem: Patient Education: Go to Patient Education Activity  Goal: Patient/Family Education  Outcome: Progressing Towards Goal     Problem: Falls - Risk of  Goal: *Absence of Falls  Description: Document Angelica Lot Risk and appropriate interventions in the flowsheet.   Outcome: Progressing Towards Goal  Note: Fall Risk Interventions:            Medication Interventions: Patient to call before getting OOB, Teach patient to arise slowly         History of Falls Interventions: Door open when patient unattended, Evaluate medications/consider consulting pharmacy         Problem: Patient Education: Go to Patient Education Activity  Goal: Patient/Family Education  Outcome: Progressing Towards Goal

## 2020-04-13 NOTE — ACP (ADVANCE CARE PLANNING)
requested for Advance Directive (AMD) consult. Patient requested completion of AMD and received information concerning AMD, a copy of the booklet, \"Your Right to Decide,\" and answered all of his questions concerning AMD.  He was able to complete most of the form, but did not have all of the contact information required concerning his primary and secondary agent. Patient stated he would get this information as soon as able and would have his nurse contact the  when he was able to complete this form. Informed him of availability of  and pastoral care services. Visited by Rev. Trisha Erickson MDiv, [facility], [address]   paging service: [phone]

## 2020-04-14 PROBLEM — Z98.890 STATUS POST CATHETER ABLATION OF ATRIAL FLUTTER: Status: ACTIVE | Noted: 2020-04-14

## 2020-04-14 PROBLEM — I48.3 TYPICAL ATRIAL FLUTTER (HCC): Status: ACTIVE | Noted: 2020-04-12

## 2020-04-14 PROCEDURE — 77010033678 HC OXYGEN DAILY

## 2020-04-14 PROCEDURE — 77030016899 HC CBL EP EXT4 BSC -B: Performed by: INTERNAL MEDICINE

## 2020-04-14 PROCEDURE — C1894 INTRO/SHEATH, NON-LASER: HCPCS | Performed by: INTERNAL MEDICINE

## 2020-04-14 PROCEDURE — 74011250637 HC RX REV CODE- 250/637: Performed by: SPECIALIST

## 2020-04-14 PROCEDURE — 4A023FZ MEASUREMENT OF CARDIAC RHYTHM, PERCUTANEOUS APPROACH: ICD-10-PCS | Performed by: INTERNAL MEDICINE

## 2020-04-14 PROCEDURE — 94660 CPAP INITIATION&MGMT: CPT

## 2020-04-14 PROCEDURE — B24BZZZ ULTRASONOGRAPHY OF HEART WITH AORTA: ICD-10-PCS | Performed by: INTERNAL MEDICINE

## 2020-04-14 PROCEDURE — C1733 CATH, EP, OTHR THAN COOL-TIP: HCPCS | Performed by: INTERNAL MEDICINE

## 2020-04-14 PROCEDURE — 02K83ZZ MAP CONDUCTION MECHANISM, PERCUTANEOUS APPROACH: ICD-10-PCS | Performed by: INTERNAL MEDICINE

## 2020-04-14 PROCEDURE — 77030018733 HC ELECTRD KT ENSITE STJU -G: Performed by: INTERNAL MEDICINE

## 2020-04-14 PROCEDURE — 99153 MOD SED SAME PHYS/QHP EA: CPT | Performed by: INTERNAL MEDICINE

## 2020-04-14 PROCEDURE — C1759 CATH, INTRA ECHOCARDIOGRAPHY: HCPCS | Performed by: INTERNAL MEDICINE

## 2020-04-14 PROCEDURE — 77030010872 HC CBL EP BSC -C: Performed by: INTERNAL MEDICINE

## 2020-04-14 PROCEDURE — 02583ZZ DESTRUCTION OF CONDUCTION MECHANISM, PERCUTANEOUS APPROACH: ICD-10-PCS | Performed by: INTERNAL MEDICINE

## 2020-04-14 PROCEDURE — 74011250637 HC RX REV CODE- 250/637: Performed by: NURSE PRACTITIONER

## 2020-04-14 PROCEDURE — 77030003390 HC NDL ANGI MRTM -A: Performed by: INTERNAL MEDICINE

## 2020-04-14 PROCEDURE — 74011250636 HC RX REV CODE- 250/636: Performed by: INTERNAL MEDICINE

## 2020-04-14 PROCEDURE — 74011250637 HC RX REV CODE- 250/637: Performed by: INTERNAL MEDICINE

## 2020-04-14 PROCEDURE — C1730 CATH, EP, 19 OR FEW ELECT: HCPCS | Performed by: INTERNAL MEDICINE

## 2020-04-14 PROCEDURE — 74011000250 HC RX REV CODE- 250: Performed by: INTERNAL MEDICINE

## 2020-04-14 PROCEDURE — 99152 MOD SED SAME PHYS/QHP 5/>YRS: CPT | Performed by: INTERNAL MEDICINE

## 2020-04-14 PROCEDURE — 77030010880 HC CBL EP SUPRME STJU -C: Performed by: INTERNAL MEDICINE

## 2020-04-14 PROCEDURE — 93653 COMPRE EP EVAL TX SVT: CPT | Performed by: INTERNAL MEDICINE

## 2020-04-14 PROCEDURE — 65660000001 HC RM ICU INTERMED STEPDOWN

## 2020-04-14 PROCEDURE — 93613 INTRACARDIAC EPHYS 3D MAPG: CPT | Performed by: INTERNAL MEDICINE

## 2020-04-14 PROCEDURE — 77030039046 HC PAD DEFIB RADIOTRNSPNT CNMD -B: Performed by: INTERNAL MEDICINE

## 2020-04-14 RX ORDER — MIDAZOLAM HYDROCHLORIDE 1 MG/ML
INJECTION, SOLUTION INTRAMUSCULAR; INTRAVENOUS AS NEEDED
Status: DISCONTINUED | OUTPATIENT
Start: 2020-04-14 | End: 2020-04-14 | Stop reason: HOSPADM

## 2020-04-14 RX ORDER — HYDROCODONE BITARTRATE AND ACETAMINOPHEN 5; 325 MG/1; MG/1
1 TABLET ORAL
Status: DISCONTINUED | OUTPATIENT
Start: 2020-04-14 | End: 2020-04-15 | Stop reason: HOSPADM

## 2020-04-14 RX ORDER — SODIUM CHLORIDE 0.9 % (FLUSH) 0.9 %
5-40 SYRINGE (ML) INJECTION AS NEEDED
Status: DISCONTINUED | OUTPATIENT
Start: 2020-04-14 | End: 2020-04-15 | Stop reason: HOSPADM

## 2020-04-14 RX ORDER — LISINOPRIL 20 MG/1
20 TABLET ORAL DAILY
Status: DISCONTINUED | OUTPATIENT
Start: 2020-04-15 | End: 2020-04-15 | Stop reason: HOSPADM

## 2020-04-14 RX ORDER — SODIUM CHLORIDE 0.9 % (FLUSH) 0.9 %
5-40 SYRINGE (ML) INJECTION EVERY 8 HOURS
Status: CANCELLED | OUTPATIENT
Start: 2020-04-14

## 2020-04-14 RX ORDER — AMLODIPINE BESYLATE 5 MG/1
10 TABLET ORAL DAILY
Status: DISCONTINUED | OUTPATIENT
Start: 2020-04-15 | End: 2020-04-15 | Stop reason: HOSPADM

## 2020-04-14 RX ORDER — LIDOCAINE HYDROCHLORIDE 10 MG/ML
INJECTION INFILTRATION; PERINEURAL AS NEEDED
Status: DISCONTINUED | OUTPATIENT
Start: 2020-04-14 | End: 2020-04-14 | Stop reason: HOSPADM

## 2020-04-14 RX ORDER — SODIUM CHLORIDE 0.9 % (FLUSH) 0.9 %
5-40 SYRINGE (ML) INJECTION EVERY 8 HOURS
Status: DISCONTINUED | OUTPATIENT
Start: 2020-04-14 | End: 2020-04-15 | Stop reason: HOSPADM

## 2020-04-14 RX ORDER — SODIUM CHLORIDE 0.9 % (FLUSH) 0.9 %
5-40 SYRINGE (ML) INJECTION AS NEEDED
Status: CANCELLED | OUTPATIENT
Start: 2020-04-14

## 2020-04-14 RX ORDER — FENTANYL CITRATE 50 UG/ML
INJECTION, SOLUTION INTRAMUSCULAR; INTRAVENOUS AS NEEDED
Status: DISCONTINUED | OUTPATIENT
Start: 2020-04-14 | End: 2020-04-14 | Stop reason: HOSPADM

## 2020-04-14 RX ADMIN — Medication 10 ML: at 18:07

## 2020-04-14 RX ADMIN — LUBIPROSTONE 8 MCG: 8 CAPSULE, GELATIN COATED ORAL at 08:19

## 2020-04-14 RX ADMIN — AMIODARONE HYDROCHLORIDE 400 MG: 200 TABLET ORAL at 08:18

## 2020-04-14 RX ADMIN — Medication 10 ML: at 07:06

## 2020-04-14 RX ADMIN — GABAPENTIN 600 MG: 600 TABLET, FILM COATED ORAL at 21:26

## 2020-04-14 RX ADMIN — Medication 10 ML: at 21:27

## 2020-04-14 RX ADMIN — Medication 10 ML: at 18:08

## 2020-04-14 RX ADMIN — LUBIPROSTONE 8 MCG: 8 CAPSULE, GELATIN COATED ORAL at 18:07

## 2020-04-14 RX ADMIN — CITALOPRAM HYDROBROMIDE 20 MG: 20 TABLET ORAL at 08:18

## 2020-04-14 RX ADMIN — RIVAROXABAN 20 MG: 20 TABLET, FILM COATED ORAL at 18:07

## 2020-04-14 RX ADMIN — Medication 10 ML: at 21:26

## 2020-04-14 RX ADMIN — ATORVASTATIN CALCIUM 20 MG: 20 TABLET, FILM COATED ORAL at 08:18

## 2020-04-14 NOTE — PROGRESS NOTES
[facility] Adult  Hospitalist Group                                                                                          Hospitalist Progress Note  Marcela Oleary MD  Answering service: [phone] from in house phone        Date of Service:  2020  NAME:  Sriram Lopez  :  1965  MRN:  [id number]      Admission Summary:   Sriram Lopez is a 49 y.o. male who to [facility] emergency department with complaint of chest pain, diaphoresis and near syncope x2 today. Patient reported that around 9 AM he had an episode of near syncope which resolved and then again this afternoon at [facility] when he also had chest pain for approximately 15 minutes which did not radiate. He reported feeling palpitations, shortness of breath diaphoresis. He was transferred to [address] [place] [address] ED with concern for STEMI. He was seen by cardiology Dr. Linda Schuster who determined he did not have STEMI and does not warrant cardiac cath at this time. Patient was noted to be in atrial flutter/A. fib persistent and was started on a Cardizem drip. In ED included negative d-dimer, negative troponin x1, normal CBC, elevated glucose, elevated creatinine of 1.43 with GFR greater than 60. No history of CKD per patient, however, he is extremely morbidly obese and has a very significant family history of diabetes including both of his parents, both of his grandparents and all of his siblings. He reports that he does not have diabetes, although I suspect that he does based on his elevated glucose and creatinine, his weight, amily history as well as his explanation of being on Lasix due to lower extremity edema which he reports is not related to his heart but is because he \"drinks a ton of water. \"  He reports drinking 2 to 4 gallons of water per day plus milk and juice.   He also reports polyphagia but states that he prefers salty foods over sweet.     At this time the patient is stable, alert and oriented x4, moves all extremities. He states he has never been  and has no children, but does help take care of his nephew who is 15. He reports that today is the 3rd episode of near syncope w/ feeling palpitations that he has had and that the first one was approximately 6 months ago. He reports that he has not had it evaluated prior to today. At this time, patient reports fatigue, polydipsia but denies dizziness, double vision, HA, CP, SOB, palpitations, cough, abdominal pain, n/v/d or weakness. He reports chronic neuropathic pain to LLE. At this time, we will admit to step down. Interval history / Subjective: Follow up afib-flutter  Patient seen and examined at the bedside. Labs, images and notes reviewed  Discussed with nursing staff, orders reviewed. Plan discussed with patient/Family    Pt is feeling ok. No chest pain. Has palpitation. No SOB or diaphoresis. Aware about cardiology recommendations and possible ablation tomorrow. Assessment & Plan:     Near syncope x2: likely 2/2 afib/aflutter  -Seen by cardiology in ED; no previous cardiac hx  -CXR neg  -Orthostatic VS x3  -Follow cardiology recommendations     Persistent Afib/Aflutter  -New dx  -Seen by cards in ED, started on dig, amiodarone and metoprolol PO  -Also, had cardizem gtt, goal for HR less than 100. Now off GTT  -Cont PO Amio and Lopressor per cardiology. EP consulted  -Off Dig now  -Likely Ablation tomorrow per EP  -TSH NL, negative troponin  -Echo complete, unremarkable  -Appreciate cards input     Chest Pain  -Resolved, likely 2/2 above  -Appreciate cards input; no cath indicated at this time     HTN  -Takes lisinopril and amlodipine at home; hold for now  -Started on metoprolol by cards.    -Was on cardizem gtt     Elevated creatinine: w/ normal GFR for AA  -Unclear if this is CKD as pt has no hx  -Avoid nephrotoxic agents  -Repeat labs in AM     Elevated glucose: Denies hx DM  -Suspect he has DM d/t elevated glucose on labs, hx of polydipsia/phagia/uria, strong family hx, morbid obesity  -Check A1c, fasting glucose in AM; also check ADH     Hx BLE edema  -Pt reports d/t drinking 2-4 gallons of water daily  -Hold lasix for now  -Only trace Edema; ED ordered doppler of LLE d/t neuropathic pain/edema-pending  -Check for DM, ADH  -Daily weights, I/Os  -Ambulate, OOB TID and PRN     Hx Depression  -Continue celexa     Hx HLD  -Continue lipitor     Hx LLE neuropathy  -Continue gabapentin     DVTppx: Heparin  GIppx: NA  Diet: Cardiac  Code Status: Full  Activity: Ambulates independently, OOB TID and PRN w/ assist  Discharge: TBD       Hospital Problems  Date Reviewed: 4/12/2020          Codes Class Noted POA    Persistent atrial fibrillation ICD-10-CM: I48.19  ICD-9-CM: 427.31  4/12/2020 Yes        * (Principal) Atrial flutter ([facility] ICD-10-CM: I48.92  ICD-9-CM: 427.32  4/12/2020 Yes        Morbid obesity ([facility] ICD-10-CM: E66.01  ICD-9-CM: 278.01  4/12/2020 Yes        Hypertensive heart disease without heart failure ICD-10-CM: I11.9  ICD-9-CM: 402.90  4/12/2020 Yes        Neuropathic pain ICD-10-CM: M79.2  ICD-9-CM: 729.2  4/12/2020 Yes        Chest pain ICD-10-CM: R07.9  ICD-9-CM: 786.50  4/12/2020 Yes        Near syncope ICD-10-CM: R55  ICD-9-CM: 780.2  Unknown Yes        Edema of both legs ICD-10-CM: R60.0  ICD-9-CM: 842. 3  Unknown Yes        Elevated serum glucose ICD-10-CM: R73.9  ICD-9-CM: 790.29  Unknown Yes        Elevated serum creatinine ICD-10-CM: R79.89  ICD-9-CM: 790.99  Unknown Yes        Atrial fibrillation and flutter (HCC) ICD-10-CM: I48.91, I48.92  ICD-9-CM: 427.31, 427.32  4/12/2020 Unknown                Review of Systems:   A comprehensive review of systems was negative except for that written in the HPI. Vital Signs:    Last 24hrs VS reviewed since prior progress note.  Most recent are:  Visit Vitals  /90   Pulse 97   Temp 98.4 °F (36.9 °C)   Resp 16   Wt (!) 201.5 kg (444 lb 3.6 oz)   SpO2 92%         Intake/Output Summary (Last 24 hours) at 4/13/2020 2111  Last data filed at 4/13/2020 1358  Gross per 24 hour   Intake 1640 ml   Output 425 ml   Net 1215 ml        Physical Examination:     General:  Alert, cooperative, no distress, appears stated age, morbidly obese   Head:  Normocephalic, without obvious abnormality, atraumatic. Eyes:  Conjunctivae/corneas clear. Pupils equal, round, reactive to light. Extraocular movements intact. Lungs:   Clear to auscultation bilaterally. Abdomen:   Soft, non-tender. Bowel sounds normal. +protuberant, +hernia   Extremities: Extremities normal, atraumatic, no cyanosis, chronic edema, slight, L>R   Pulses: 2+ and symmetric all extremities. Skin: Skin color, texture, turgor normal. No rashes or lesions. Lymph nodes: Cervical, supraclavicular, and axillary nodes normal.   Neurologic: CNII-XII intact. Normal strength, sensation, and reflexes throughout. Data Review:    Review and/or order of clinical lab test  Review and/or order of tests in the radiology section of [facility]  Review and/or order of tests in the medicine section of [facility]    Radiology  Xr Chest Port    Result Date: 4/12/2020  IMPRESSION: No acute cardiopulmonary disease. Labs:     Recent Labs     04/13/20  0614 04/12/20  1229   WBC 10.0 9.6   HGB 14.2 15.8   HCT 44.1 48.5    288     Recent Labs     04/13/20  0614 04/12/20  1229    145   K 4.4 4.0    105   CO2 27 27   BUN 21* 19   CREA 1.30 1.43*   *  102* 120*   CA 10.0 10.2*   MG 2.2 1.9     Recent Labs     04/12/20  1229   SGOT 18   ALT 38      TBILI 0.4   TP 9.0*   ALB 4.2   GLOB 4.8*     Recent Labs     04/13/20  1100   APTT 27.9      No results for input(s): FE, TIBC, PSAT, FERR in the last 72 hours. No results found for: FOL, RBCF   No results for input(s): PH, PCO2, PO2 in the last 72 hours.   Recent Labs     04/12/20  1229   TROIQ <0.05     No results found for: CHOL, CHOLX, CHLST, CHOLV, HDL, HDLP, LDL, LDLC, DLDLP, TGLX, TRIGL, TRIGP, CHHD, CHHDX  No results found for: GLUCPOC  No results found for: COLOR, APPRN, SPGRU, REFSG, MAYELIN, PROTU, GLUCU, KETU, BILU, UROU, CEE, LEUKU, GLUKE, EPSU, BACTU, WBCU, RBCU, CASTS, UCRY      Medications Reviewed:     Current Facility-Administered Medications   Medication Dose Route Frequency    rivaroxaban (XARELTO) tablet 20 mg  20 mg Oral DAILY WITH DINNER    amiodarone (CORDARONE) tablet 400 mg  400 mg Oral TID    metoprolol tartrate (LOPRESSOR) tablet 50 mg  50 mg Oral Q8H    acetaminophen (TYLENOL) tablet 650 mg  650 mg Oral Q4H PRN    atorvastatin (LIPITOR) tablet 20 mg  20 mg Oral DAILY    citalopram (CELEXA) tablet 20 mg  20 mg Oral DAILY    lubiPROStone (AMITIZA) capsule 8 mcg  8 mcg Oral BID WITH MEALS    sodium chloride (NS) flush 5-40 mL  5-40 mL IntraVENous Q8H    sodium chloride (NS) flush 5-40 mL  5-40 mL IntraVENous PRN    ondansetron (ZOFRAN) injection 4 mg  4 mg IntraVENous Q4H PRN    gabapentin (NEURONTIN) tablet 600 mg  600 mg Oral QHS     ______________________________________________________________________  EXPECTED LENGTH OF STAY: - - -  ACTUAL LENGTH OF STAY:          1                 Masha Jean MD

## 2020-04-14 NOTE — PROGRESS NOTES
TRANSFER - IN REPORT:    Verbal report received from Ansley Holm RN on Marianela Beck  being received from cath lab procedure area  for routine progression of care. Report consisted of patients Situation, Background, Assessment and Recommendations(SBAR). Information from the following report(s) Kardex and Procedure Summary was reviewed with the receiving clinician. Opportunity for questions and clarification was provided. Assessment completed upon patients arrival to [address] [place] [address] and care assumed. Cardiac Cath Lab Recovery Arrival Note:    Marianela Beck arrived to [facility] recovery area. Patient procedure= A Flutter Ablation. Patient on cardiac monitor, non-invasive blood pressure, SPO2 monitor. On O2 @ 4 lpm via NC.  IV  of NS on pump at 20 ml/hr. Patient status doing well without problems. Patient is A&Ox 3. Patient reports no pain    PROCEDURE SITE CHECK:    Procedure site:without any bleeding and no hematoma, No pain/discomfort reported at procedure site. No change in patient status. Continue to monitor patient and status.

## 2020-04-14 NOTE — PROGRESS NOTES
TRANSFER - OUT REPORT:    Verbal report given to Verena EAGLE on Cj De La Fuente being transferred to [facility]  for routine progression of care       Report consisted of patients Situation, Background, Assessment and   Recommendations(SBAR). Information from the following report(s) Kardex and Procedure Summary was reviewed with the receiving nurse. Opportunity for questions and clarification was provided.

## 2020-04-14 NOTE — PROGRESS NOTES
Reason for Admission:  Patient is s/p ablation, admitted with Afib/aflutter                     RUR Score: 10% risk of re-admission                     Plan for utilizing home health:   No home health needs identified at this time        PCP: First and Last name:  Dr. Fermin Posada   Name of Practice: [address] [place] [address]   Are you a current patient: Yes/No: Yes Approximate date of last visit: N/A                    Current Advanced Directive/Advance Care Plan: Full Code, not on file                          Transition of Care Plan:  Home    The CM met with the patient at bedside in order to introduce the role of CM and assess for patient needs. The patient lives at home with his father and 24-year-old nephew, patient verified demographics and insurance information. The patient is independent with ADLs and mobility- does have home CPAP machine in addition to Home Oxygen (concentrator only- utilizes at night). The patient endorses he does not wear Oxygen in the community- only when he is sleeping, has concentrator through [employer]. The patient denied difficulty accessing medications- utilizes [employer], CM placed free-30-day Xarelto card on the patient's chart. The patient endorses his nephew will transport home upon discharge. No other needs or concerns identified at this time, CM will follow for transitions of care. SARAH Vasquez     Care Management Interventions  PCP Verified by CM: Yes(Patient verified PCp as Dr. Veronique Arriaga)  Mode of Transport at Discharge:  Other (see comment)(Family will transport )  Transition of Care Consult (CM Consult): Discharge Planning  MyChart Signup: No  Physical Therapy Consult: No  Occupational Therapy Consult: No  Speech Therapy Consult: No  Current Support Network: Own Home, Other(Patient lives in home with his father and 24-year-old nephew )  Confirm Follow Up Transport: Family  Discharge Location  Discharge Placement: Home

## 2020-04-14 NOTE — PROGRESS NOTES
0730: Bedside shift change report given to Danilo Allison RN, (oncoming nurse) by Abby Estrada (offgoing nurse). Report included the following information SBAR, Kardex, Intake/Output, MAR, Accordion, Recent Results, and Cardiac Rhythm a flutter . 1000: TRANSFER - OUT REPORT:    Verbal report given to Dajuan Leiva RN, (name) on Pixie Dakin  being transferred to cath lab (unit) for ordered procedure       Report consisted of patients Situation, Background, Assessment and   Recommendations(SBAR). Information from the following report(s) SBAR, Intake/Output, MAR, Accordion, Recent Results and Cardiac Rhythm a flutter was reviewed with the receiving nurse. Lines:   Peripheral IV 04/12/20 Right Antecubital (Active)   Phlebitis Assessment 0 4/14/2020  8:15 AM   Infiltration Assessment 0 4/14/2020  8:15 AM   Dressing Status Clean, dry, & intact 4/14/2020  8:15 AM   Dressing Type Transparent;Tape 4/14/2020  8:15 AM   Hub Color/Line Status Pink;Capped 4/14/2020  8:15 AM   Action Taken Open ports on tubing capped 4/14/2020  8:15 AM   Alcohol Cap Used Yes 4/14/2020  8:15 AM       Peripheral IV 04/12/20 Left Hand (Active)   Phlebitis Assessment 0 4/14/2020  8:15 AM   Infiltration Assessment 0 4/14/2020  8:15 AM   Dressing Status Clean, dry, & intact 4/14/2020  8:15 AM   Dressing Type Transparent;Tape 4/14/2020  8:15 AM   Hub Color/Line Status Green;Capped 4/14/2020  8:15 AM   Action Taken Open ports on tubing capped 4/14/2020  8:15 AM   Alcohol Cap Used Yes 4/14/2020  8:15 AM        Opportunity for questions and clarification was provided. Patient transported with:   Monitor  Registered Nurse      1350: TRANSFER - IN REPORT:    Verbal report received from Jl English, (name) on Pixie Dakin  being received from cath lab (unit) for routine progression of care      Report consisted of patients Situation, Background, Assessment and   Recommendations(SBAR).      Information from the following report(s) SBAR, Kardex, Procedure Summary, Intake/Output, MAR, Accordion, Recent Results and Cardiac Rhythm NSR was reviewed with the receiving nurse. Opportunity for questions and clarification was provided. Assessment completed upon patients arrival to unit and care assumed. 1930: Bedside shift change report given to Kamran Sanchez RN, (oncoming nurse) by Romina Sanchez RN, (offgoing nurse). Report included the following information SBAR, Kardex, Procedure Summary, Intake/Output, MAR, Accordion, Recent Results and Cardiac Rhythm NSR/Sinus Carson Lowe. Problem: Afib Pathway: Day 3  Goal: Activity/Safety  Outcome: Progressing Towards Goal  Goal: Diagnostic Test/Procedures  Outcome: Progressing Towards Goal  Goal: Nutrition/Diet  Outcome: Progressing Towards Goal  Goal: Discharge Planning  Outcome: Progressing Towards Goal  Goal: Medications  Outcome: Progressing Towards Goal  Goal: Respiratory  Outcome: Progressing Towards Goal  Goal: Treatments/Interventions/Procedures  Outcome: Progressing Towards Goal  Goal: Psychosocial  Outcome: Progressing Towards Goal     Problem: Pressure Injury - Risk of  Goal: *Prevention of pressure injury  Description: Document Jerry Scale and appropriate interventions in the flowsheet.   Outcome: Progressing Towards Goal  Note: Pressure Injury Interventions:  Sensory Interventions: Assess changes in LOC, Assess need for specialty bed, Check visual cues for pain, Keep linens dry and wrinkle-free, Maintain/enhance activity level, Pressure redistribution bed/mattress (bed type)         Activity Interventions: Increase time out of bed    Mobility Interventions: Pressure redistribution bed/mattress (bed type), PT/OT evaluation    Nutrition Interventions: Document food/fluid/supplement intake, Discuss nutritional consult with provider, Offer support with meals,snacks and hydration    Friction and Shear Interventions: Lift sheet                Problem: Patient Education: Go to Patient Education Activity  Goal: Patient/Family Education  Outcome: Progressing Towards Goal     Problem: Falls - Risk of  Goal: *Absence of Falls  Description: Document Tabitha Egan Fall Risk and appropriate interventions in the flowsheet.   Outcome: Progressing Towards Goal  Note: Fall Risk Interventions:            Medication Interventions: Teach patient to arise slowly         History of Falls Interventions: Consult care management for discharge planning, Door open when patient unattended         Problem: Patient Education: Go to Patient Education Activity  Goal: Patient/Family Education  Outcome: Progressing Towards Goal

## 2020-04-14 NOTE — PROGRESS NOTES
Cardiac Electrophysiology Hospital Progress Note     Subjective:      Cj De La Fuente is a 49 y.o. patient who is seen for evaluation of typical atrial flutter with rapid ventricular rate. Dr Lena Walton kindly asked me to see him  He was admitted on 04/12/2020 with chest pain, diaphoresis, & near syncope x 1 day. Also with associated palpitations & SOB. This was the 3rd episode of near syncope with palpitations; 1st episode occurred approx 6 months ago. No prior evaluation. CXR normal on date of admission. ECG in ER showed 2:1 atrial flutter with RVR (122 bpm). Troponin normal, elevated Cr & Hgb A1c (6.1). D-dimer normal.      Diltiazem IV gtt initiated, then discontinued at 1900 on 04/12/2020. Amiodarone 400 mg po tid initiated on 04/12/2020, also with digoxin 250 mcg IV q 6 hrs & metoprolol 50 mg po q 8 hrs. He continues with typical atrial flutter today     On CPAP    [id number] 1 (HTN), but prediabetic per Hgb A1c.      xarelto first dose 4/13/2020    Morbidly obese, reportedly considering gastric bypass but is delayed due COVID 19 elective surgery postponement      Limited echo (04/12/2020): LVEF 65-70%, no RWMA, severe concentric LVH. Previous:  SHARA on BiPAP. Bilat lower extremity edema with (reportedly) related neuropathy. On gabapentin.       Current Facility-Administered Medications   Medication Dose Route Frequency Provider Last Rate Last Dose    rivaroxaban (XARELTO) tablet 20 mg  20 mg Oral DAILY WITH Lynn Ibrahim MD   20 mg at 04/13/20 1714    amiodarone (CORDARONE) tablet 400 mg  400 mg Oral TID Annette Barriga MD   400 mg at 04/13/20 2108    metoprolol tartrate (LOPRESSOR) tablet 50 mg  50 mg Oral Q8H Annette Barriga MD   Stopped at 04/14/20 0705    acetaminophen (TYLENOL) tablet 650 mg  650 mg Oral Q4H PRN Opal Mendiola NP        atorvastatin (LIPITOR) tablet 20 mg  20 mg Oral DAILY Opal Mendiola NP   20 mg at 04/13/20 0910    citalopram (Rina [place]) tablet 20 mg  20 mg Oral DAILY Prabhjot Glasgow NP   20 mg at 04/13/20 1557    lubiPROStone (AMITIZA) capsule 8 mcg  8 mcg Oral BID WITH MEALS Prabhjot Glasgow NP   8 mcg at 04/13/20 1605    sodium chloride (NS) flush 5-40 mL  5-40 mL IntraVENous Q8H Prabhjot Glasgow NP   10 mL at 04/14/20 9267    sodium chloride (NS) flush 5-40 mL  5-40 mL IntraVENous PRN Prabhjot Glasgow NP        ondansetron [facility]) injection 4 mg  4 mg IntraVENous Q4H PRN Prabhjot Glasgow NP        gabapentin (NEURONTIN) tablet 600 mg  600 mg Oral QHS Prabhjot Glasgow NP   600 mg at 04/13/20 2108     No Known Allergies  Past Medical History:   Diagnosis Date    Atrial flutter ([facility] [address]     Depression     Edema of both legs     Elevated serum creatinine     Elevated serum glucose     HLD (hyperlipidemia)     Hypertensive heart disease without heart failure 4/12/2020    Morbid obesity ([address] 4/12/2020    Near syncope     Neuropathic pain 4/12/2020    SHARA treated with BiPAP     Persistent atrial fibrillation 0/65/5385    Umbilical hernia 4/16/8306     Past Surgical History:   Procedure Laterality Date    HX HEENT      Jaw    HX ORTHOPAEDIC      Left Femur    HX ORTHOPAEDIC      Left Hip     Family History   Problem Relation Age of Onset    Diabetes Mother     Schizophrenia Mother     Diabetes Father     Prostate Cancer Father     Kidney Disease Father         ESRD ON HD    Diabetes Sister     Diabetes Brother     Kidney Disease Brother         ESRD ON HD     Social History     Tobacco Use    Smoking status: Never Smoker    Smokeless tobacco: Never Used   Substance Use Topics    Alcohol use: Not Currently     Comment: rarely        Review of Systems:   Constitutional: Negative for fever, chills, weight loss, + malaise/fatigue. HEENT: Negative for nosebleeds, vision changes.    Respiratory: Negative for cough, hemoptysis  Cardiovascular: + recent chest pain, + palpitations, no orthopnea, claudication, + chronic leg swelling, + near syncope, and no PND. Gastrointestinal: Negative for nausea, vomiting, diarrhea, blood in stool and melena. Genitourinary: Negative for dysuria, and hematuria. Musculoskeletal: Negative for myalgias, arthralgia. Skin: Negative for rash. Heme: Does not bleed or bruise easily. Neurological: Negative for speech change and focal weakness. + bilat lower extremity neuropathy  Endocrine: Polyphagia, polydipsia. Objective:     Visit Vitals  /72 (BP 1 Location: Right arm, BP Patient Position: At rest)   Pulse 80   Temp 97.5 °F (36.4 °C)   Resp 16   Wt (!) 445 lb 12.3 oz (202.2 kg)   SpO2 100%      Physical Exam:   Constitutional: Well-developed and well-nourished. No respiratory distress. Head: Normocephalic and atraumatic. Eyes: Pupils are equal, round  ENT: Hearing grossly normal, CPAP  Neck: Supple. No JVD present. Cardiovascular: controlled rate, irregular rhythm. Exam reveals no gallop and no friction rub. No murmur heard. Pulmonary/Chest: Effort normal and breath sounds normal. No wheezes. Abdominal: Morbidly obese. Musculoskeletal: Trace bilat lower extremity edema. Neurological: Alert,oriented. Skin: Skin is warm and dry  Psychiatric: Normal mood and affect. Behavior is normal. Judgment and thought content normal.      EKG (04/12/2020): 2:1 AFL with RVR (122 bpm). Assessment/Plan:      Mr. Agustin Suarez was admitted with 2:1 AFL with RVR  He called his insurance and ok with ablation and xarelto   SUMANTH is avoided due to risk of COVID at this time  Will use intracardiac echo to rule out BENJI thrombus and vascular ultrasound for femoral access guidance  His procedure is tentatively set for 11 am today  He agrees to proceed        Thank you for involving me in this patient's care and please call with further concerns or questions. Yanique Yao M.D.   Electrophysiology/Cardiology  [facility] [place]  [address] [facility], [address] [place] [address] Avenue                             [address] [place] [address]  [phone]

## 2020-04-14 NOTE — PROCEDURES
Cardiac Procedure Note   Patient: Aidee Kaiser  MRN: [id number]  SSN: [id number]   YOB: 1965 Age: 49 y.o. Sex: male    Date of Procedure: 4/14/2020   Pre-procedure Diagnosis: typical atrial flutter rapid ventricular rate, morbid obesity, hypertension severe LVH, sleep apnea, CKD  Post-procedure Diagnosis: same  Procedure: EP Study, Ablation of atrial flutter   Intracardiac echocardiogram  :  Dr. Brent Strickland MD    Assistant(s):  None  Anesthesia: Moderate Sedation   Estimated Blood Loss: Less than 10 mL   Specimens Removed: None  Findings: very difficult to sedate with versed and fentanyl.   Anesthesia for MAC was not available   NSR and bidirectional line of block post ablation  Intracardiac echo did not show thrombus, + severe LVH  Complications: None   Implants:  None  Signed by:  Brent Strickland MD  4/14/2020  12:14 PM

## 2020-04-14 NOTE — PROGRESS NOTES
Cardiology Progress Note            Admit Date: 4/12/2020  Admit Diagnosis: Chest pain [R07.9]  Atrial fibrillation and flutter ([facility] [address] [I48.91, I48.92]  Date: 4/14/2020     Time: 8:58 AM    Subjective:  Feeling well. No C/o CP or SOB. Flutter on Telemetry. Rate around 90     Assessment and Plan     1. Afib/aflutter   - new onset   - Xarelto started   - rate controlled on Amio PO load, Lopressor 50 mg BID   - TSH NML  2. Morbid obesity   Weight 454 pounds   He was to have gastric bypass - postponed 2/2 pandemic  3. SHARA/OHV   - BIPAP at home  4. HTN   - Norvasc, Lisinopril PTA    AFlutter on telemetry. Didn't medically CV on meds, but rate controlled. For ablation procedure today. Abebe Vee MD 4/14/2020 12:01 PM         No results found for: CADE   Past Medical History:   Diagnosis Date    Atrial flutter ([facility] [address]     Depression     Edema of both legs     Elevated serum creatinine     Elevated serum glucose     HLD (hyperlipidemia)     Hypertensive heart disease without heart failure 4/12/2020    Morbid obesity ([address] 4/12/2020    Near syncope     Neuropathic pain 4/12/2020    SHARA treated with BiPAP     Persistent atrial fibrillation 6/39/2303    Umbilical hernia 5/06/9010      Social History     Tobacco Use    Smoking status: Never Smoker    Smokeless tobacco: Never Used   Substance Use Topics    Alcohol use: Not Currently     Comment: rarely    Drug use: Yes     Types: Marijuana     Comment: occasional           Review of Systems:    [] Patient unable to provide secondary to condition    [x] All systems negative, except as checked below.   Constitutional:    []Weight Change  []Fever   []Chills   []Night Sweats  []Fatigue  []Malaise  []____  ENT/Mouth:     []Hearing Changes  []Ear Pain  []Nasal Congestion   []Sinus Pain  []Hoarseness   []Sore throat  []Rhinorrhea  []Swallowing Difficulty  []____  Eyes:    []Eye Pain []Swelling  []Redness  []Foreign Body  []Discharge  []Vision Changes  []____  Cardiovascular:    []Chest Pain  []SOB  []PND  []GRIER  []Orthopnea  []Claudication  []Edema   []Palpitations  []____  Respiratory:    []Cough  []Sputum  []Wheezing,  []SOB  []Hemoptysis  []____  Gastrointestinal:    []Nausea  []Vomiting  []Diarrhea  []Constipation  []Pain  []Heartburn  []Anorexia  []Dysphagia  []Hematochezia  []Melena,  []Jaundice  []____  Genitourinary:    []Dysuria  []Urinary Frequency  []Hematuria  []Urinary Incontinence  []Urgency  []Flank Pain  []Hesitancy  []____  Musculoskeletal:    []Arthralgias  []Myalgias  []Joint Swelling  []Joint Stiffness  []Back Pain  []Neck Pain  []____  Skin:    []Skin Lesions  []Pruritis  []Hair Changes  []Skin rashes  []____  Neuro:    []Weakness  []Numbness  []Paresthesias  []Loss of Consciousness  []Syncope   []Dizziness  []Headache  []Coordination Changes  []Recent Falls  []____  Psych:    []Anxiety/Depression  []Insomnia  []Memory Changes  []Violence/Abuse Hx.  []____  Heme/Lymph:    []Bruising  []Bleeding  []Lymphadenopathy  []____  Endocrine:    []Polyuria  []Polydipsia  []Temperature Intolerance  []____         Objective:     Physical Exam:                Visit Vitals  /72 (BP 1 Location: Right arm, BP Patient Position: At rest)   Pulse 80   Temp 97.5 °F (36.4 °C)   Resp 16   Wt (!) 202.2 kg (445 lb 12.3 oz)   SpO2 100%        General Appearance:   Well developed, well nourished,alert and oriented x 3, and   individual in no acute distress. Ears/Nose/Mouth/Throat:    Hearing grossly normal.         Neck:  Supple. Chest:    Lungs clear to auscultation bilaterally. Cardiovascular:   Irregular rate and rhythm, S1, S2 normal, no murmur. Abdomen:    Soft, non-tender, bowel sounds are active. Extremities:  No edema bilaterally. Skin:  Warm and dry.      Telemetry: aflutter     Data Review:    Labs:    Recent Results (from the past 24 hour(s))   PTT    Collection Time: 04/13/20 11:00 AM   Result Value Ref Range    aPTT 27.9 22.1 - 32.0 sec    aPTT, therapeutic range     58.0 - 77.0 SECS          Radiology:        Current Facility-Administered Medications   Medication Dose Route Frequency    rivaroxaban (XARELTO) tablet 20 mg  20 mg Oral DAILY WITH DINNER    amiodarone (CORDARONE) tablet 400 mg  400 mg Oral TID    metoprolol tartrate (LOPRESSOR) tablet 50 mg  50 mg Oral Q8H    acetaminophen (TYLENOL) tablet 650 mg  650 mg Oral Q4H PRN    atorvastatin (LIPITOR) tablet 20 mg  20 mg Oral DAILY    citalopram (CELEXA) tablet 20 mg  20 mg Oral DAILY    lubiPROStone (AMITIZA) capsule 8 mcg  8 mcg Oral BID WITH MEALS    sodium chloride (NS) flush 5-40 mL  5-40 mL IntraVENous Q8H    sodium chloride (NS) flush 5-40 mL  5-40 mL IntraVENous PRN    ondansetron (ZOFRAN) injection 4 mg  4 mg IntraVENous Q4H PRN    gabapentin (NEURONTIN) tablet 600 mg  600 mg Oral QHS       Isaias Stoll MD     [facility] [address] [place] [address] [address]   Deidra Messer   [phone]

## 2020-04-14 NOTE — PROGRESS NOTES
Transfer to [address] [place] [address] from Procedure Area    Verbal report given to Lesly Okeefe RN on Marianela Beck being transferred to Cardiac Cath Lab  for routine post - op   Patient is post A Flutter Ablation  procedure. Patient stable upon transfer to . Report consisted of patients Situation, Background, Assessment and   Recommendations(SBAR). Information from the following report(s) Procedure Summary, Intake/Output, MAR and Cardiac Rhythm NSR was reviewed with the receiving nurse. Opportunity for questions and clarification was provided. Patient medicated during procedure with orders obtained and verified by Dr. Hortencia Thompson. Refer to patient PROCEDURE REPORT for vital signs, assessment, status, and response during procedure.

## 2020-04-14 NOTE — PROGRESS NOTES
Cardiac Cath Lab Procedure Area Arrival Note:    Boston Jiménez arrived to Cardiac Cath Lab, Procedure Area. Patient identifiers verified with NAME and DATE OF BIRTH. Procedure verified with patient. Consent forms verified. Allergies verified. Patient informed of procedure and plan of care. Questions answered with review. Patient voiced understanding of procedure and plan of care. Patient on cardiac monitor, non-invasive blood pressure, SPO2 monitor. On room air. Placed on  O2 @ 2 lpm via nasal cannula. IV of NS on pump at 25 ml/hr. Patient status doing well without problems. Patient is A&Ox 4. Patient reports no pain. Patient medicated during procedure with orders obtained and verified by Dr. Johnna Jansen. Refer to patients Cardiac Cath Lab PROCEDURE REPORT for vital signs, assessment, status, and response during procedure, printed at end of case. Printed report on chart or scanned into chart.

## 2020-04-14 NOTE — PROGRESS NOTES
Physical Therapy Screening:  Services are not indicated at this time. [facility] based on results obtained during the nursing admission assessment. The patients chart was reviewed and the patient is not appropriate for a skilled therapy evaluation at this time. Please consult physical therapy if any therapy needs arise. Thank you.     Sandy Morrison, PT

## 2020-04-15 VITALS
HEART RATE: 63 BPM | BODY MASS INDEX: 40.43 KG/M2 | OXYGEN SATURATION: 94 % | SYSTOLIC BLOOD PRESSURE: 116 MMHG | RESPIRATION RATE: 18 BRPM | TEMPERATURE: 98.5 F | DIASTOLIC BLOOD PRESSURE: 73 MMHG | WEIGHT: 315 LBS | HEIGHT: 74 IN

## 2020-04-15 PROCEDURE — 74011250637 HC RX REV CODE- 250/637: Performed by: INTERNAL MEDICINE

## 2020-04-15 PROCEDURE — 74011250637 HC RX REV CODE- 250/637: Performed by: NURSE PRACTITIONER

## 2020-04-15 RX ADMIN — CITALOPRAM HYDROBROMIDE 20 MG: 20 TABLET ORAL at 08:29

## 2020-04-15 RX ADMIN — AMLODIPINE BESYLATE 10 MG: 5 TABLET ORAL at 08:28

## 2020-04-15 RX ADMIN — LUBIPROSTONE 8 MCG: 8 CAPSULE, GELATIN COATED ORAL at 08:29

## 2020-04-15 RX ADMIN — ATORVASTATIN CALCIUM 20 MG: 20 TABLET, FILM COATED ORAL at 08:28

## 2020-04-15 RX ADMIN — Medication 10 ML: at 08:28

## 2020-04-15 RX ADMIN — LISINOPRIL 20 MG: 20 TABLET ORAL at 08:29

## 2020-04-15 NOTE — PROGRESS NOTES
Hospital follow-up PCP transitional care appointment has been scheduled with Dr. Lana Garcia for Tuesday, 4/21/20 at 10:15 a.m. Pending patient discharge.   Melvina Or, Care Management Specialist.

## 2020-04-15 NOTE — PROGRESS NOTES
0730: Bedside shift change report given to Ruth [facility] (oncoming nurse) by Mann Gonzales, RN (offgoing nurse). Report included the following information SBAR, Kardex, Intake/Output, MAR, Accordion, Recent Results and Cardiac Rhythm NSR.     1300: Discharge medications reviewed with patient and appropriate educational materials and side effects teaching were provided. I have reviewed discharge instructions with the patient. The patient verbalized understanding. Patient discharged with RN to outpatient pharmacy, RX received and discharged      Problem: Afib Pathway: Day 2  Goal: Activity/Safety  Outcome: Progressing Towards Goal  Goal: Consults, if ordered  Outcome: Progressing Towards Goal  Goal: Diagnostic Test/Procedures  Outcome: Progressing Towards Goal  Goal: Nutrition/Diet  Outcome: Progressing Towards Goal  Goal: Discharge Planning  Outcome: Progressing Towards Goal  Goal: Medications  Outcome: Progressing Towards Goal  Goal: Respiratory  Outcome: Progressing Towards Goal  Goal: Treatments/Interventions/Procedures  Outcome: Progressing Towards Goal  Goal: Psychosocial  Outcome: Progressing Towards Goal  Goal: *Hemodynamically stable  Outcome: Progressing Towards Goal  Goal: *Optimal pain control at patient's stated goal  Outcome: Progressing Towards Goal  Goal: *Stable cardiac rhythm  Outcome: Progressing Towards Goal  Goal: *Lungs clear or at baseline  Outcome: Progressing Towards Goal  Goal: *Describes available resources and support systems  Outcome: Progressing Towards Goal     Problem: Pressure Injury - Risk of  Goal: *Prevention of pressure injury  Description: Document Jerry Scale and appropriate interventions in the flowsheet.   Outcome: Progressing Towards Goal  Note: Pressure Injury Interventions:  Sensory Interventions: Assess changes in LOC, Keep linens dry and wrinkle-free, Maintain/enhance activity level, Minimize linen layers         Activity Interventions: Increase time out of bed, Pressure redistribution bed/mattress(bed type)    Mobility Interventions: Pressure redistribution bed/mattress (bed type)    Nutrition Interventions: Document food/fluid/supplement intake    Friction and Shear Interventions: Lift sheet, Minimize layers                Problem: Patient Education: Go to Patient Education Activity  Goal: Patient/Family Education  Outcome: Progressing Towards Goal     Problem: Falls - Risk of  Goal: *Absence of Falls  Description: Document Edilma Fall Risk and appropriate interventions in the flowsheet.   Outcome: Progressing Towards Goal  Note: Fall Risk Interventions:            Medication Interventions: Evaluate medications/consider consulting pharmacy, Teach patient to arise slowly         History of Falls Interventions: Door open when patient unattended         Problem: Patient Education: Go to Patient Education Activity  Goal: Patient/Family Education  Outcome: Progressing Towards Goal     Problem: Cath Lab Procedures: Post-Cath Day 1  Goal: Activity/Safety  Outcome: Progressing Towards Goal  Goal: Diagnostic Test/Procedures  Outcome: Progressing Towards Goal  Goal: Nutrition/Diet  Outcome: Progressing Towards Goal  Goal: Discharge Planning  Outcome: Progressing Towards Goal  Goal: Medications  Outcome: Progressing Towards Goal  Goal: Respiratory  Outcome: Progressing Towards Goal  Goal: Treatments/Interventions/Procedures  Outcome: Progressing Towards Goal  Goal: Psychosocial  Outcome: Progressing Towards Goal     Problem: Cath Lab Procedures: Discharge Outcomes  Goal: *Stable cardiac rhythm  Outcome: Progressing Towards Goal  Goal: *Hemodynamically stable  Outcome: Progressing Towards Goal  Goal: *Optimal pain control at patient's stated goal  Outcome: Progressing Towards Goal  Goal: *Pulses palpable, skin color within defined limits, skin temperature warm  Outcome: Progressing Towards Goal  Goal: *Lungs clear or at baseline  Outcome: Progressing Towards Goal  Goal: *Demonstrates ability to perform prescribed activity without shortness of breath or discomfort  Outcome: Progressing Towards Goal  Goal: *Verbalizes home exercise program, activity guidelines, cardiac precautions  Outcome: Progressing Towards Goal  Goal: *Verbalizes understanding and describes prescribed diet  Outcome: Progressing Towards Goal  Goal: *Verbalizes understanding and describes medication purposes and frequencies  Outcome: Progressing Towards Goal  Goal: *Identifies cardiac risk factors  Outcome: Progressing Towards Goal  Goal: *No signs and symptoms of infection or wound complications  Outcome: Progressing Towards Goal  Goal: *Anxiety reduced or absent  Outcome: Progressing Towards Goal  Goal: *Verbalizes and demonstrates incision care  Outcome: Progressing Towards Goal  Goal: *Understands and describes signs and symptoms to report to providers(Stroke Metric)  Outcome: Progressing Towards Goal  Goal: *Describes follow-up/return visits to physicians  Outcome: Progressing Towards Goal  Goal: *Describes available resources and support systems  Outcome: Progressing Towards Goal

## 2020-04-15 NOTE — PROGRESS NOTES
Transitions of Care: 10% risk of re-admission         -Patient will discharge home today    -PCP and Cardiology follow-up   -Family to transport    The patient is to discharge today, no case management needs identified- patient is independent, per previous conversations nephew will transport home. The patient has 30-day Xarelto savings cards on chart- the bedside RN aware. CM will follow for transitions of care needs.  SARAH Lainez

## 2020-04-15 NOTE — PROGRESS NOTES
1930: Bedside and Verbal shift change report given to [address], RN (oncoming nurse) by Gunner Dowd RN (offgoing nurse). Report included the following information SBAR, Kardex, ED Summary, Procedure Summary, Intake/Output, MAR, Accordion, Recent Results and Med Rec Status. 0730: Bedside and Verbal shift change report given to [address] (oncoming nurse) by [address], RN (offgoing nurse). Report included the following information SBAR, Kardex, ED Summary, Procedure Summary, Intake/Output, MAR, Accordion, Recent Results and Med Rec Status.

## 2020-04-15 NOTE — PROGRESS NOTES
[address] [facility] Adult  Hospitalist Group                                                                                          Hospitalist Progress Note  Marlon Chen MD  Answering service: [phone] from in house phone        Date of Service:  2020  NAME:  Hemal Anderson  :  1965  MRN:  [id number]      Admission Summary:   Hemal Anderson is a 49 y.o. male who to [facility] emergency department with complaint of chest pain, diaphoresis and near syncope x2 today. Patient reported that around 9 AM he had an episode of near syncope which resolved and then again this afternoon at [facility] when he also had chest pain for approximately 15 minutes which did not radiate. He reported feeling palpitations, shortness of breath diaphoresis. He was transferred to [facility] ED with concern for STEMI. He was seen by cardiology Dr. Barrett Litten who determined he did not have STEMI and does not warrant cardiac cath at this time. Patient was noted to be in atrial flutter/A. fib persistent and was started on a Cardizem drip. In ED included negative d-dimer, negative troponin x1, normal CBC, elevated glucose, elevated creatinine of 1.43 with GFR greater than 60. No history of CKD per patient, however, he is extremely morbidly obese and has a very significant family history of diabetes including both of his parents, both of his grandparents and all of his siblings. He reports that he does not have diabetes, although I suspect that he does based on his elevated glucose and creatinine, his weight, amily history as well as his explanation of being on Lasix due to lower extremity edema which he reports is not related to his heart but is because he \"drinks a ton of water. \"  He reports drinking 2 to 4 gallons of water per day plus milk and juice.   He also reports polyphagia but states that he prefers salty foods over sweet.     At this time the patient is stable, alert and oriented x4, moves all extremities. He states he has never been  and has no children, but does help take care of his nephew who is 15. He reports that today is the 3rd episode of near syncope w/ feeling palpitations that he has had and that the first one was approximately 6 months ago. He reports that he has not had it evaluated prior to today. At this time, patient reports fatigue, polydipsia but denies dizziness, double vision, HA, CP, SOB, palpitations, cough, abdominal pain, n/v/d or weakness. He reports chronic neuropathic pain to LLE. At this time, we will admit to step down. Interval history / Subjective: Follow up afib-flutter  Patient seen and examined at the bedside. Labs, images and notes reviewed  Discussed with nursing staff, orders reviewed. Plan discussed with patient/Family    Pt is feeling ok. No chest pain. Rate is controlled at rest but with RVR on minimal exertion or movement with persistent flutter on telemetry. No SOB or diaphoresis. Cardiology to proceed with ablation later today. Assessment & Plan:     Near syncope x2: likely 2/2 afib/aflutter  -Seen by cardiology in ED; no previous cardiac hx  -CXR neg  -Orthostatic VS x3  -Follow cardiology recommendations     Persistent Afib/Aflutter  -New dx  -Seen by cards in ED, started on dig, amiodarone and metoprolol PO  -Off Cardizem GTT  -Cont PO Amio and Lopressor per cardiology. EP consulted  -Off Dig now  -EP to proceed with ablation for A.flutter today. -TSH NL, negative troponin  -Echo complete, unremarkable  -Appreciate cards input     Chest Pain  -Resolved, likely 2/2 above  -Appreciate cards input; no cath indicated at this time     HTN  -Takes lisinopril and amlodipine at home; hold for now  -Started on metoprolol by cards.    -Was on cardizem gtt     Elevated creatinine: w/ normal GFR for AA  -Unclear if this is CKD as pt has no hx  -Avoid nephrotoxic agents  -Repeat labs in AM     Elevated glucose: Denies hx DM  -Suspect he has DM d/t elevated glucose on labs, hx of polydipsia/phagia/uria, strong family hx, morbid obesity  -Check A1c, fasting glucose in AM; also check ADH     Hx BLE edema  -Pt reports d/t drinking 2-4 gallons of water daily  -Hold lasix for now  -Only trace Edema; ED ordered doppler of LLE d/t neuropathic pain/edema-pending  -Check for DM, ADH  -Daily weights, I/Os  -Ambulate, OOB TID and PRN     Hx Depression  -Continue celexa     Hx HLD  -Continue lipitor     Hx LLE neuropathy  -Continue gabapentin     DVTppx: Heparin  GIppx: NA  Diet: Cardiac  Code Status: Full  Activity: Ambulates independently, OOB TID and PRN w/ assist  Discharge: TBD       Hospital Problems  Date Reviewed: 4/12/2020          Codes Class Noted POA    Status post catheter ablation of atrial flutter ICD-10-CM: Z98.890  ICD-9-CM: V45.89  4/14/2020 Unknown    Overview Signed 4/14/2020 12:14 PM by Lashae Hernandez MD     4/14/2020             Persistent atrial fibrillation ICD-10-CM: I48.19  ICD-9-CM: 427.31  4/12/2020 Yes        * (Principal) Atrial flutter ([facility] [address] ICD-10-CM: R83.29  ICD-9-CM: 427.32  4/12/2020 Yes        Morbid obesity ([facility] [address] ICD-10-CM: E66.01  ICD-9-CM: 278.01  4/12/2020 Yes        Hypertensive heart disease without heart failure ICD-10-CM: I11.9  ICD-9-CM: 402.90  4/12/2020 Yes        Neuropathic pain ICD-10-CM: M79.2  ICD-9-CM: 729.2  4/12/2020 Yes        Chest pain ICD-10-CM: R07.9  ICD-9-CM: 786.50  4/12/2020 Yes        Near syncope ICD-10-CM: R55  ICD-9-CM: 780.2  Unknown Yes        Edema of both legs ICD-10-CM: R60.0  ICD-9-CM: 873. 3  Unknown Yes        Elevated serum glucose ICD-10-CM: R73.9  ICD-9-CM: 790.29  Unknown Yes        Elevated serum creatinine ICD-10-CM: R79.89  ICD-9-CM: 790.99  Unknown Yes        Typical atrial flutter [facility]) ICD-10-CM: I48.3  ICD-9-CM: 427.32  4/12/2020                 Review of Systems:   A comprehensive review of systems was negative except for that written in the HPI.        Vital Signs:    Last 24hrs VS reviewed since prior progress note. Most recent are:  Visit Vitals  /79 (BP 1 Location: Right arm, BP Patient Position: At rest)   Pulse (!) 59   Temp 97.8 °F (36.6 °C)   Resp 18   Ht 6' 2\" (1.88 m)   Wt (!) 202.2 kg (445 lb 12.3 oz)   SpO2 100%   BMI 57.23 kg/m²         Intake/Output Summary (Last 24 hours) at 4/14/2020 2207  Last data filed at 4/14/2020 1600  Gross per 24 hour   Intake 440 ml   Output 0 ml   Net 440 ml        Physical Examination:     General:  Alert, cooperative, no distress, appears stated age, morbidly obese   Head:  Normocephalic, without obvious abnormality, atraumatic. Eyes:  Conjunctivae/corneas clear. Pupils equal, round, reactive to light. Extraocular movements intact. Lungs:   Clear to auscultation bilaterally. Abdomen:   Soft, non-tender. Bowel sounds normal. +protuberant, +hernia   Extremities: Extremities normal, atraumatic, no cyanosis, chronic edema, slight, L>R   Pulses: 2+ and symmetric all extremities. Skin: Skin color, texture, turgor normal. No rashes or lesions. Lymph nodes: Cervical, supraclavicular, and axillary nodes normal.   Neurologic: CNII-XII intact. Normal strength, sensation, and reflexes throughout. Data Review:    Review and/or order of clinical lab test  Review and/or order of tests in the radiology section of [facility]  Review and/or order of tests in the medicine section of [facility]    Radiology  Xr Chest Port    Result Date: 4/12/2020  IMPRESSION: No acute cardiopulmonary disease.        Labs:     Recent Labs     04/13/20  0614 04/12/20  1229   WBC 10.0 9.6   HGB 14.2 15.8   HCT 44.1 48.5    288     Recent Labs     04/13/20  0614 04/12/20  1229    145   K 4.4 4.0    105   CO2 27 27   BUN 21* 19   CREA 1.30 1.43*   *  102* 120*   CA 10.0 10.2*   MG 2.2 1.9     Recent Labs     04/12/20  1229   SGOT 18   ALT 38      TBILI 0.4   TP 9.0*   ALB 4.2   GLOB 4.8*     Recent Labs     04/13/20  1100   APTT 27.9      No results for input(s): FE, TIBC, PSAT, FERR in the last 72 hours. No results found for: FOL, RBCF   No results for input(s): PH, PCO2, PO2 in the last 72 hours.   Recent Labs     04/12/20  1229   TROIQ <0.05     No results found for: CHOL, CHOLX, CHLST, CHOLV, HDL, HDLP, LDL, LDLC, DLDLP, TGLX, TRIGL, TRIGP, CHHD, CHHDX  No results found for: GLUCPOC  No results found for: COLOR, APPRN, SPGRU, REFSG, MAYELIN, PROTU, GLUCU, KETU, BILU, UROU, CEE, LEUKU, GLUKE, EPSU, BACTU, WBCU, RBCU, CASTS, UCRY      Medications Reviewed:     Current Facility-Administered Medications   Medication Dose Route Frequency    sodium chloride (NS) flush 5-40 mL  5-40 mL IntraVENous Q8H    sodium chloride (NS) flush 5-40 mL  5-40 mL IntraVENous PRN    HYDROcodone-acetaminophen (NORCO) 5-325 mg per tablet 1 Tab  1 Tab Oral Q4H PRN    [START ON 4/15/2020] lisinopriL (PRINIVIL, ZESTRIL) tablet 20 mg  20 mg Oral DAILY    [START ON 4/15/2020] amLODIPine (NORVASC) tablet 10 mg  10 mg Oral DAILY    rivaroxaban (XARELTO) tablet 20 mg  20 mg Oral DAILY WITH DINNER    acetaminophen (TYLENOL) tablet 650 mg  650 mg Oral Q4H PRN    atorvastatin (LIPITOR) tablet 20 mg  20 mg Oral DAILY    citalopram (CELEXA) tablet 20 mg  20 mg Oral DAILY    lubiPROStone (AMITIZA) capsule 8 mcg  8 mcg Oral BID WITH MEALS    sodium chloride (NS) flush 5-40 mL  5-40 mL IntraVENous Q8H    sodium chloride (NS) flush 5-40 mL  5-40 mL IntraVENous PRN    ondansetron (ZOFRAN) injection 4 mg  4 mg IntraVENous Q4H PRN    gabapentin (NEURONTIN) tablet 600 mg  600 mg Oral QHS     ______________________________________________________________________  EXPECTED LENGTH OF STAY: 1d 21h  ACTUAL LENGTH OF STAY:          2                 Megha Licona MD

## 2020-04-15 NOTE — PROGRESS NOTES
Cardiology Progress Note            Admit Date: 4/12/2020  Admit Diagnosis: Chest pain [R07.9]  Atrial fibrillation and flutter ([facility] [address] [I48.91, I48.92]  Date: 4/15/2020     Time: 8:58 AM    Subjective:  Feeling better, post ablation. NSR with HR 58. Assessment and Plan     1. Afib/aflutter   - s/p ablation   - Now in NSR  2. Morbid obesity   Weight 454 pounds   He was to have gastric bypass - postponed 2/2 pandemic  3. SHARA/OHV   - BIPAP at home  4. HTN   - Norvasc, Lisinopril PTA    NSR on telemetry following aflutter ablation. Would continue on PTA meds at discharge. He can follow up at [address] [place] [address] on discharge. Needs no further cardiac testing or intervention. OK for discharge today, from Cardiology standpoint. No results found for: CADE   Past Medical History:   Diagnosis Date    Atrial flutter ([facility] [address]     Depression     Edema of both legs     Elevated serum creatinine     Elevated serum glucose     HLD (hyperlipidemia)     Hypertensive heart disease without heart failure 4/12/2020    Morbid obesity ([facility] [address] 4/12/2020    Near syncope     Neuropathic pain 4/12/2020    SHARA treated with BiPAP     Persistent atrial fibrillation 1/36/7509    Umbilical hernia 1/91/2968      Social History     Tobacco Use    Smoking status: Never Smoker    Smokeless tobacco: Never Used   Substance Use Topics    Alcohol use: Not Currently     Comment: rarely    Drug use: Yes     Types: Marijuana     Comment: occasional           Review of Systems:    [] Patient unable to provide secondary to condition    [x] All systems negative, except as checked below.   Constitutional:    []Weight Change  []Fever   []Chills   []Night Sweats  []Fatigue  []Malaise  []____  ENT/Mouth:     []Hearing Changes  []Ear Pain  []Nasal Congestion   []Sinus Pain  []Hoarseness   []Sore throat  []Rhinorrhea  []Swallowing Difficulty  []____  Eyes:    []Eye Pain  []Swelling []Redness  []Foreign Body  []Discharge  []Vision Changes  []____  Cardiovascular:    []Chest Pain  []SOB  []PND  []GRIER  []Orthopnea  []Claudication  []Edema   []Palpitations  []____  Respiratory:    []Cough  []Sputum  []Wheezing,  []SOB  []Hemoptysis  []____  Gastrointestinal:    []Nausea  []Vomiting  []Diarrhea  []Constipation  []Pain  []Heartburn  []Anorexia  []Dysphagia  []Hematochezia  []Melena,  []Jaundice  []____  Genitourinary:    []Dysuria  []Urinary Frequency  []Hematuria  []Urinary Incontinence  []Urgency  []Flank Pain  []Hesitancy  []____  Musculoskeletal:    []Arthralgias  []Myalgias  []Joint Swelling  []Joint Stiffness  []Back Pain  []Neck Pain  []____  Skin:    []Skin Lesions  []Pruritis  []Hair Changes  []Skin rashes  []____  Neuro:    []Weakness  []Numbness  []Paresthesias  []Loss of Consciousness  []Syncope   []Dizziness  []Headache  []Coordination Changes  []Recent Falls  []____  Psych:    []Anxiety/Depression  []Insomnia  []Memory Changes  []Violence/Abuse Hx.  []____  Heme/Lymph:    []Bruising  []Bleeding  []Lymphadenopathy  []____  Endocrine:    []Polyuria  []Polydipsia  []Temperature Intolerance  []____         Objective:     Physical Exam:                Visit Vitals  /51 (BP 1 Location: Right arm, BP Patient Position: At rest)   Pulse (!) 52   Temp 97.1 °F (36.2 °C)   Resp 18   Ht 6' 2\" (1.88 m)   Wt (!) 446 lb 10.4 oz (202.6 kg)   SpO2 98%   BMI 57.35 kg/m²        General Appearance:   Well developed, well nourished,alert and oriented x 3, and   individual in no acute distress. Ears/Nose/Mouth/Throat:    Hearing grossly normal.         Neck:  Supple. Chest:    Lungs clear to auscultation bilaterally. Cardiovascular:   Irregular rate and rhythm, S1, S2 normal, no murmur. Abdomen:    Soft, non-tender, bowel sounds are active. Extremities:  No edema bilaterally. Skin:  Warm and dry.      Telemetry: normal sinus rhythm     Data Review:    Labs:    No results found for this or any previous visit (from the past 24 hour(s)). Radiology:        Current Facility-Administered Medications   Medication Dose Route Frequency    sodium chloride (NS) flush 5-40 mL  5-40 mL IntraVENous Q8H    sodium chloride (NS) flush 5-40 mL  5-40 mL IntraVENous PRN    HYDROcodone-acetaminophen (NORCO) 5-325 mg per tablet 1 Tab  1 Tab Oral Q4H PRN    lisinopriL (PRINIVIL, ZESTRIL) tablet 20 mg  20 mg Oral DAILY    amLODIPine (NORVASC) tablet 10 mg  10 mg Oral DAILY    rivaroxaban (XARELTO) tablet 20 mg  20 mg Oral DAILY WITH DINNER    acetaminophen (TYLENOL) tablet 650 mg  650 mg Oral Q4H PRN    atorvastatin (LIPITOR) tablet 20 mg  20 mg Oral DAILY    citalopram (CELEXA) tablet 20 mg  20 mg Oral DAILY    lubiPROStone (AMITIZA) capsule 8 mcg  8 mcg Oral BID WITH MEALS    sodium chloride (NS) flush 5-40 mL  5-40 mL IntraVENous Q8H    sodium chloride (NS) flush 5-40 mL  5-40 mL IntraVENous PRN    ondansetron (ZOFRAN) injection 4 mg  4 mg IntraVENous Q4H PRN    gabapentin (NEURONTIN) tablet 600 mg  600 mg Oral QHS       Isaias Pollack  Cardiology Attending:Patient seen and examined. I agree with NP assessment and plans. Can resume prior BP regimen, no more flutter.     Abebe Vee MD 4/15/2020 10:58 AM          [facility], [address] [place] [address],[address]   Deidra Messer   [phone]

## 2020-04-15 NOTE — DISCHARGE INSTRUCTIONS
Continue with xarelto 20 mg every dinner    Patient Instructions Post-EP Study and Ablation    1. No heavy lifting or exercises for 1 week. This includes the following:  Do not push or move furniture, jog or run    2. Do not drive for 2 days. 3.  Call Dr. Alex Bose at [phone] if you experience any of the following symptoms:  Dizziness, lightheadedness, fainting spells  Lack of energy  Shortness of breath  Rapid heart rate  Chest or muscle twitches  Blurry vision, double vision, weakness, numbness  Nausea, vomiting  Fever  Bleeding in the stool, black stool  Leg swelling, pain    4. Follow-up with Dr. Alex Bose  2 months due to COVID 19 restriction now  Future Appointments   Date Time Provider Yaya Mata   6/16/2020  4:20 PM MD Margarita Ramirez M.D. Electrophysiology/Cardiology  [facility] [place]  [facility], [facility] [address] [place] [address]  [phone]       Discharge Instructions       PATIENT ID: Efrain Billy  MRN: [id number]   YOB: 1965    DATE OF ADMISSION: 4/12/2020 12:20 PM    DATE OF DISCHARGE: 4/15/2020    PRIMARY CARE PROVIDER: Юлия Moreno MD     ATTENDING PHYSICIAN: Marc Lucas MD  DISCHARGING PROVIDER: Juan Malhotra MD    To contact this individual call [phone] and ask the  to page. If unavailable ask to be transferred the Adult Hospitalist Department.     DISCHARGE DIAGNOSES   #Near syncope x2, secondary to atrial fibrillation/flutter   #Persistent atrial flutter with RVR, s/p ablation of atrial flutter by Dr. Alex Bose on 4/14/20, on Xarelto daily  #Chest pain, resolved    CONSULTATIONS: IP CONSULT TO CARDIOLOGY  IP CONSULT TO HOSPITALIST  IP CONSULT TO ELECTROPHYSIOLOGY    PROCEDURES/SURGERIES: Procedure(s):  ABLATION A-FLUTTER  Ep 3d Mapping    PENDING TEST RESULTS:   At the time of discharge the following test results are still pending: none    FOLLOW UP APPOINTMENTS:   Follow-up Information     Follow up With Specialties Details Why Contact Info    Gorge Hernandez MD Family Practice In 1 week As needed, If symptoms worsen and post discharge follow up with CBC, CMP monitoring [address] [place] [address]      Carolyn Reilly MD Cardiology Call in 1 day Schedule follow up in 2 months. Or if needed earlier, call his office, for wound re-check and concerns related to Atrial flutter Ablation procedure done on 4/14/20 [facility] [address]  [address]             ADDITIONAL CARE RECOMMENDATIONS:   -Follow-up with PCP in 1 week with CBC, CMP monitoring  -Follow-up with cardiology in 2-4 weeks  -Follow-up with EP, Dr. Grace Huerta in 2 months for assessment of A flutter ablation done on 4/14/2020  · It is important that you take the medication exactly as they are prescribed. · Keep your medication in the bottles provided by the pharmacist and keep a list of the medication names, dosages, and times to be taken in your wallet. · Do not take other medications without consulting your doctor. · No drinking alcohol or driving car or operating machinery if you are on narcotic pain medications. Donot take sedating mediations if you are sleepy or confused. · Fall Precautions  · Keep Well Hydrated  · Report to your medical provider if you feel you have  developed allergies to medications  · Follow up with your PCP or Consultant for medication adjustments and refills  · Monitor for signs of fevers,chills,bleeding,chest pain and seek medical attention if you do so. Patient Instructions Post-EP Study and Ablation     1. No heavy lifting or exercises for 1 week. This includes the following:  Do not push or move furniture, jog or run     2.   Do not drive for 2 days.     3.  Call Dr. David Fernández at [phone] if you experience any of the following symptoms:  Dizziness, lightheadedness, fainting spells  Lack of energy  Shortness of breath  Rapid heart rate  Chest or muscle twitches  Blurry vision, double vision, weakness, numbness  Nausea, vomiting  Fever  Bleeding in the stool, black stool  Leg swelling, pain     4. Follow-up with Dr. Beck Pittman  2 months due to COVID 19 restriction now         Future Appointments   Date Time Provider Yaya Mata   6/16/2020  4:20 PM Chas García  [address]      5. Continue with xarelto 20 mg every dinner      DIET: Cardiac Diet    ACTIVITY: Activity as tolerated. Follow cardiology recommendations as noted above in cardiology/EP discharge instructions. WOUND CARE: As per cardiology    EQUIPMENT needed: N/A      DISCHARGE MEDICATIONS:   See Medication Reconciliation Form    · It is important that you take the medication exactly as they are prescribed. · Keep your medication in the bottles provided by the pharmacist and keep a list of the medication names, dosages, and times to be taken in your wallet. · Do not take other medications without consulting your doctor. NOTIFY YOUR PHYSICIAN FOR ANY OF THE FOLLOWING:   Fever over 101 degrees for 24 hours. Chest pain, shortness of breath, fever, chills, nausea, vomiting, diarrhea, change in mentation, falling, weakness, bleeding. Severe pain or pain not relieved by medications. Or, any other signs or symptoms that you may have questions about. DISPOSITION:  x Home With:   OT  PT  HH  RN       SNF/Inpatient Rehab/LTAC    Independent/assisted living    Hospice    Other:     CDMP Checked:   Yes x     PROBLEM LIST Updated:  Yes x        My Medications      START taking these medications      Instructions Each Dose to Equal Morning Noon Evening Bedtime   rivaroxaban 20 mg Tab tablet  Commonly known as:  Beto Garcia    Your last dose was: Your next dose is: Take 1 Tab by mouth daily (with dinner).    20 mg CONTINUE taking these medications      Instructions Each Dose to Equal Morning Noon Evening Bedtime   Amitiza 8 mcg capsule  Generic drug:  lubiPROStone    Your last dose was: Your next dose is: Take 8 mcg by mouth two (2) times daily (with meals). 8 mcg                 amLODIPine 10 mg tablet  Commonly known as:  NORVASC    Your last dose was: Your next dose is: Take 10 mg by mouth daily. 10 mg                 atorvastatin 20 mg tablet  Commonly known as:  LIPITOR    Your last dose was: Your next dose is: Take 20 mg by mouth daily. 20 mg                 b complex vitamins tablet    Your last dose was: Your next dose is: Take 1 Tab by mouth daily. 1 Tab                 citalopram 20 mg tablet  Commonly known as:  CELEXA    Your last dose was: Your next dose is: Take 20 mg by mouth daily. 20 mg                 Claritin-D 24 Hour  mg per tablet  Generic drug:  loratadine-pseudoephedrine    Your last dose was: Your next dose is: Take 1 Tab by mouth daily. 1 Tab                 Flonase Allergy Relief 50 mcg/actuation nasal spray  Generic drug:  fluticasone propionate    Your last dose was: Your next dose is: 2 Sprays by Both Nostrils route daily. 2 Spray                 furosemide 20 mg tablet  Commonly known as:  LASIX    Your last dose was: Your next dose is: Take 20 mg by mouth daily. Indications: visible water retention   20 mg                 gabapentin 600 mg tablet  Commonly known as:  NEURONTIN    Your last dose was: Your next dose is: Take 600 mg by mouth nightly. 600 mg                 lisinopriL 20 mg tablet  Commonly known as:  Shady Álvarez    Your last dose was: Your next dose is: Take 20 mg by mouth daily. 20 mg                       Where to Get Your Medications      These medications were sent to [facility] - [facility], [address] [place] [id number]    Phone:  [phone]   · rivaroxaban 20 mg Tab tablet         Signed:   Jose Guardado MD  4/15/2020  10:23 AM

## 2020-04-15 NOTE — DISCHARGE SUMMARY
Discharge Summary       PATIENT ID: Brenda Lion  MRN: [id number]   YOB: 1965    DATE OF ADMISSION: 4/12/2020 12:20 PM    DATE OF DISCHARGE: 04/15/20   PRIMARY CARE PROVIDER: Laura Hall MD     ATTENDING PHYSICIAN: Noa Pulido MD  DISCHARGING PROVIDER: Noa Pulido MD    To contact this individual call [phone] and ask the  to page. If unavailable ask to be transferred the Adult Hospitalist Department. CONSULTATIONS: IP CONSULT TO CARDIOLOGY  IP CONSULT TO HOSPITALIST  IP CONSULT TO ELECTROPHYSIOLOGY    PROCEDURES/SURGERIES: Procedure(s):  ABLATION A-FLUTTER  Ep 3d Mapping    ADMITTING [address] [place] [address] COURSE:   #Near syncope x2, secondary to atrial fibrillation/flutter   #Persistent atrial flutter with RVR, s/p ablation of atrial flutter by Dr. Clark Graves on 4/14/20, on Xarelto daily  #Chest pain, resolved    Admission Summary:   Travis Biswas a 49 y. o. male who to [facility] emergency department with complaint of chest pain, diaphoresis and near syncope x2 today. Luisa Galindo reported that around 9 AM he had an episode of near syncope which resolved and then again this afternoon at [facility] when he also had chest pain for approximately 15 minutes which did not radiate.  He reported feeling palpitations, shortness of breath diaphoresis.  He was transferred to [facility] ED with concern for STEMI. Evan Crow was seen by cardiology Dr. Mae Knox who determined he did not have STEMI and does not warrant cardiac cath at this time.  Patient was noted to be in atrial flutter/A. fib persistent and was started on a Cardizem drip.  In ED included negative d-dimer, negative troponin x1, normal CBC, elevated glucose, elevated creatinine of 1.43 with GFR greater than 60.  No history of CKD per patient, however, he is extremely morbidly obese and has a very significant family history of diabetes including both of his parents, both of his grandparents and all of his siblings. Evan Crow reports that he does not have diabetes, although I suspect that he does based on his elevated glucose and creatinine, his weight, amily history as well as his explanation of being on Lasix due to lower extremity edema which he reports is not related to his heart but is because he \"drinks a ton of water. \" Celso Vanessa reports drinking 2 to 4 gallons of water per day plus milk and juice.  He also reports polyphagia but states that he prefers salty foods over sweet.     At this time the patient is stable, alert and oriented x4, moves all extremities. He states he has never been  and has no children, but does help take care of his nephew who is 15. He reports that today is the 3rd episode of near syncope w/ feeling palpitations that he has had and that the first one was approximately 6 months ago. He reports that he has not had it evaluated prior to today.  At this time, patient reports fatigue, polydipsia but denies dizziness, double vision, HA, CP, SOB, palpitations, cough, abdominal pain, n/v/d or weakness. He reports chronic neuropathic pain to LLE. At this time, we will admit to step down.     Interval history / Subjective: Follow up afib-flutter  Patient seen and examined at the bedside. Labs, images and notes reviewed  Discussed with nursing staff, orders reviewed. Plan discussed with patient/Family     Pt is feeling ok. No chest pain. No SOB or diaphoresis. Doing well post ablation procedure done the EP on 4/14/2020. Okay to DC on Xarelto. DISCHARGE DIAGNOSES / PLAN:      Near syncope x2: likely 2/2 afib/aflutter  -Seen by cardiology in ED; no previous cardiac hx  -CXR neg  -Orthostatic VS x3  -Follow cardiology recommendations     Persistent atrial flutter with RVR, s/p ablation of atrial flutter by Dr. Lorie Moore on 4/14/20, on Xarelto daily  -New dx  -Seen by cards in ED, started on dig, amiodarone and metoprolol PO  -Off Cardizem GTT  -Cont PO Amio and Lopressor per cardiology.  EP consulted  -Off Dig now  -TSH NL, negative troponin  -Echo complete, unremarkable  -Appreciate cards input  -EP to follow up in 2 months     Chest Pain  -Resolved, likely 2/2 above  -Appreciate cards input; no cath indicated at this time     HTN  -Takes lisinopril and amlodipine at home; hold for now  -Started on metoprolol by cards. -Was on cardizem gtt     Elevated creatinine: w/ normal GFR for AA  -Unclear if this is CKD as pt has no hx  -Avoid nephrotoxic agents  -Repeat labs in AM     Elevated glucose: Denies hx DM  -Suspect he has DM d/t elevated glucose on labs, hx of polydipsia/phagia/uria, strong family hx, morbid obesity  -A1c 6.1  -Lifestyle modification with low carb diet, exercise and weight loss/ optimization     Hx BLE edema  -Pt reports d/t drinking 2-4 gallons of water daily  -Hold lasix for now  -Only trace Edema; ED ordered doppler of LLE d/t neuropathic pain/edema-no DVT  -Check for DM, ADH  -Daily weights, I/Os  -Ambulate, OOB TID and PRN     Hx Depression  -Continue celexa     Hx HLD  -Continue lipitor     Hx LLE neuropathy  -Continue gabapentin     DVTppx: Heparin --> Xarelto 4/13 on  GIppx: NA  Diet: Cardiac  Code Status: Full  Activity: Ambulates independently, OOB TID and PRN w/ assist  Discharge: Home today     ADDITIONAL CARE RECOMMENDATIONS:   -Follow-up with PCP in 1 week with CBC, CMP monitoring  -Follow-up with cardiology in 2-4 weeks  -Follow-up with EP, Dr. Michael Fernández in 2 months for assessment of A flutter ablation done on 4/14/2020  · It is important that you take the medication exactly as they are prescribed. · Keep your medication in the bottles provided by the pharmacist and keep a list of the medication names, dosages, and times to be taken in your wallet. · Do not take other medications without consulting your doctor. · No drinking alcohol or driving car or operating machinery if you are on narcotic pain medications. Donot take sedating mediations if you are sleepy or confused.    · Fall Precautions  · Keep Well Hydrated  · Report to your medical provider if you feel you have  developed allergies to medications  · Follow up with your PCP or Consultant for medication adjustments and refills  · Monitor for signs of fevers,chills,bleeding,chest pain and seek medical attention if you do so. Patient Instructions Post-EP Study and Ablation     1. No heavy lifting or exercises for 1 week. This includes the following:  Do not push or move furniture, jog or run     2. Do not drive for 2 days.     3.  Call Dr. Prema Ceron at [phone] if you experience any of the following symptoms:  Dizziness, lightheadedness, fainting spells  Lack of energy  Shortness of breath  Rapid heart rate  Chest or muscle twitches  Blurry vision, double vision, weakness, numbness  Nausea, vomiting  Fever  Bleeding in the stool, black stool  Leg swelling, pain     4. Follow-up with Dr. Prema Ceron  2 months due to COVID 19 restriction now         Future Appointments   Date Time Provider [facility]   6/16/2020  4:20 PM Stef Portillo  [address]      5. Continue with xarelto 20 mg every dinner      PENDING TEST RESULTS:   At the time of discharge the following test results are still pending: None    FOLLOW UP APPOINTMENTS:    Follow-up Information     Follow up With Specialties Details Why Contact Info    Srinivas Morrison MD Family Practice On 4/21/2020 As needed, If symptoms worsen and post discharge follow up with CBC, CMP monitoring - Hospital f/u PCP appointment Tuesday, 4/21/20 @ 10:15 a.m. Please wear a mask to your appointment. [address]      Stef Portillo MD Cardiology Call in 1 day Schedule follow up in 2 months. Or if needed earlier, call his office, for wound re-check and concerns related to Atrial flutter Ablation procedure done on 4/14/20 [address]               DIET: Cardiac Diet    ACTIVITY: Activity as tolerated.   Follow cardiology recommendations as noted above in cardiology/EP discharge instructions. WOUND CARE: As per cardiology    EQUIPMENT needed: N/A      DISCHARGE MEDICATIONS:  Current Discharge Medication List      START taking these medications    Details   rivaroxaban (XARELTO) 20 mg tab tablet Take 1 Tab by mouth daily (with dinner). Qty: 30 Tab, Refills: 0         CONTINUE these medications which have NOT CHANGED    Details   lubiPROStone (Amitiza) 8 mcg capsule Take 8 mcg by mouth two (2) times daily (with meals). citalopram (CELEXA) 20 mg tablet Take 20 mg by mouth daily. lisinopriL (PRINIVIL, ZESTRIL) 20 mg tablet Take 20 mg by mouth daily. furosemide (LASIX) 20 mg tablet Take 20 mg by mouth daily. Indications: visible water retention      amLODIPine (NORVASC) 10 mg tablet Take 10 mg by mouth daily. atorvastatin (LIPITOR) 20 mg tablet Take 20 mg by mouth daily. b complex vitamins tablet Take 1 Tab by mouth daily. gabapentin (NEURONTIN) 600 mg tablet Take 600 mg by mouth nightly. fluticasone propionate (Flonase Allergy Relief) 50 mcg/actuation nasal spray 2 Sprays by Both Nostrils route daily. loratadine-pseudoephedrine (Claritin-D 24 Hour)  mg per tablet Take 1 Tab by mouth daily. NOTIFY YOUR PHYSICIAN FOR ANY OF THE FOLLOWING:   Fever over 101 degrees for 24 hours. Chest pain, shortness of breath, fever, chills, nausea, vomiting, diarrhea, change in mentation, falling, weakness, bleeding. Severe pain or pain not relieved by medications. Or, any other signs or symptoms that you may have questions about.     DISPOSITION:  x  Home With:   OT  PT  HH  RN       Long term SNF/Inpatient Rehab    Independent/assisted living    Hospice    Other:       PATIENT CONDITION AT DISCHARGE:     Functional status    Poor     Deconditioned    x Independent      Cognition    x Lucid     Forgetful     Dementia      Catheters/lines (plus indication)    Wetzel     PICC PEG    x None      Code status   x  Full code     DNR      PHYSICAL EXAMINATION AT DISCHARGE:  General:          Alert, cooperative, no distress, appears stated age. Morbidly obese  HEENT:           Atraumatic, anicteric sclerae, pink conjunctivae                          No oral ulcers, mucosa moist, throat clear, dentition fair  Neck:               Supple, symmetrical  Lungs:             Clear to auscultation bilaterally. No Wheezing or Rhonchi. No rales. Chest wall:      No tenderness  No Accessory muscle use. Heart:              Regular  rhythm,  No  murmur   No edema  Abdomen:        Soft, non-tender. Not distended. Bowel sounds normal.  Protuberant belly.  + Hernia  Extremities:     No cyanosis. No clubbing, chronic edema, +12, left> right                           Skin turgor normal, Capillary refill normal  Skin:                Not pale. Not Jaundiced  No rashes   Psych:             Not anxious or agitated.   Neurologic:      Alert, moves all extremities, answers appropriately and responds to commands       CHRONIC MEDICAL DIAGNOSES:  Problem List as of 4/15/2020 Date Reviewed: 4/12/2020          Codes Class Noted - Resolved    Status post catheter ablation of atrial flutter ICD-10-CM: Z98.890  ICD-9-CM: V45.89  4/14/2020 - Present    Overview Signed 4/14/2020 12:14 PM by Carolyn Reilly MD     4/14/2020             Persistent atrial fibrillation ICD-10-CM: I48.19  ICD-9-CM: 427.31  4/12/2020 - Present        * (Principal) Atrial flutter ([facility] ICD-10-CM: Z98.72  ICD-9-CM: 427.32  4/12/2020 - Present        Morbid obesity ([facility] ICD-10-CM: E66.01  ICD-9-CM: 278.01  4/12/2020 - Present        Umbilical hernia [id number]: K42.9  ICD-9-CM: 553.1  4/12/2020 - Present        Hypertensive heart disease without heart failure ICD-10-CM: I11.9  ICD-9-CM: 402.90  4/12/2020 - Present        Neuropathic pain ICD-10-CM: M79.2  ICD-9-CM: 729.2  4/12/2020 - Present        Chest pain ICD-10-CM: R07.9  ICD-9-CM: 786.50 4/12/2020 - Present        HLD (hyperlipidemia) ICD-10-CM: E78.5  ICD-9-CM: 272.4  Unknown - Present        SHARA treated with BiPAP ICD-10-CM: G47.33  ICD-9-CM: 327.23  Unknown - Present        Near syncope ICD-10-CM: R55  ICD-9-CM: 780.2  Unknown - Present        Depression ICD-10-CM: F32.9  ICD-9-CM: 204  Unknown - Present        Edema of both legs ICD-10-CM: R60.0  ICD-9-CM: 347. 3  Unknown - Present        Elevated serum glucose ICD-10-CM: R73.9  ICD-9-CM: 790.29  Unknown - Present        Elevated serum creatinine ICD-10-CM: R79.89  ICD-9-CM: 790.99  Unknown - Present        Typical atrial flutter (HCC) ICD-10-CM: I48.3  ICD-9-CM: 427.32  4/12/2020 - Present            Radiology  Xr Chest Port    Result Date: 4/12/2020  IMPRESSION: No acute cardiopulmonary disease. Cardiac Procedure Note   Patient: Isiah Munoz  MRN: [id number]  SSN: [id number]   YOB: 1965 Age: 49 y.o. Sex: male    Date of Procedure: 4/14/2020   Pre-procedure Diagnosis: typical atrial flutter rapid ventricular rate, morbid obesity, hypertension severe LVH, sleep apnea, CKD  Post-procedure Diagnosis: same  Procedure: EP Study, Ablation of atrial flutter   Intracardiac echocardiogram  :  Dr. Nessa Perez MD     Assistant(s):  None  Anesthesia: Moderate Sedation   Estimated Blood Loss: Less than 10 mL   Specimens Removed: None  Findings: very difficult to sedate with versed and fentanyl. Anesthesia for MAC was not available   NSR and bidirectional line of block post ablation  Intracardiac echo did not show thrombus, + severe LVH  Complications: None   Implants:  None  Signed by:  Nessa Perez MD  4/14/2020  12:14 PM    Echocardiogram 4/12/2020:  Final result   · Image quality for this study was technically difficult. · Contrast used: DEFINITY. · Normal cavity size, systolic function (ejection fraction normal) and diastolic function. Severe concentric hypertrophy.  Estimated left ventricular ejection fraction is 65 - 70%. No regional wall motion abnormality noted. Normal left ventricular strain. · Aortic valve sclerosis with no significant stenosis. · Mitral valve non-specific thickening. LE Duplex US 4/13/20:  · No evidence of left lower extremity vein thrombosis in the visualized vein segments. · Incidental finding of prominent left groin lymph node. No results found for this or any previous visit (from the past 24 hour(s)).     Greater than 45 minutes were spent with the patient on counseling and coordination of care    Signed:   Chanel Shabazz MD  4/15/2020  11:12 AM

## 2020-04-16 LAB
OSMOLALITY SERPL: 290 MOSMOL/KG (ref 275–295)
VASOPRESSIN SERPL-MCNC: NORMAL PG/ML

## 2020-06-10 ENCOUNTER — TELEPHONE (OUTPATIENT)
Dept: CARDIOLOGY CLINIC | Age: 55
End: 2020-06-10

## 2020-06-10 NOTE — TELEPHONE ENCOUNTER
Mr. Gifty Llanos opted out of scheduling a VV or Phone Call visit at this time. Patient chose to cancel appointment all together and states that he can only schedule a visit on Monday, Wednesday or Friday in office due to dialysis visits he must attend with his father. Pt expressed he will call back if there are any changes in his schedule.

## 2020-08-18 ENCOUNTER — VIRTUAL VISIT (OUTPATIENT)
Dept: CARDIOLOGY CLINIC | Age: 55
End: 2020-08-18
Payer: MEDICAID

## 2020-08-18 DIAGNOSIS — I11.9 HYPERTENSIVE HEART DISEASE WITHOUT HEART FAILURE: ICD-10-CM

## 2020-08-18 DIAGNOSIS — I48.3 TYPICAL ATRIAL FLUTTER (HCC): ICD-10-CM

## 2020-08-18 DIAGNOSIS — Z98.890 STATUS POST CATHETER ABLATION OF ATRIAL FLUTTER: Primary | ICD-10-CM

## 2020-08-18 DIAGNOSIS — E66.01 MORBID OBESITY (HCC): ICD-10-CM

## 2020-08-18 PROCEDURE — 99213 OFFICE O/P EST LOW 20 MIN: CPT | Performed by: INTERNAL MEDICINE

## 2020-08-18 NOTE — PROGRESS NOTES
Attempted to reach patient by telephone. A message was left for return call to scheduled follow up.     [employer] message sent to patient as well with follow up appointment date

## 2020-08-19 NOTE — PROGRESS NOTES
Cardiac Electrophysiology VIRTUAL VISIT Note     Pursuant to the emergency declaration under the [facility] waiver authority and the [employer] Act, this Virtual  Visit was conducted, with patient's consent, to reduce the patient's risk of exposure to COVID-19 and provide continuity of care for an established patient. Services were provided through a video synchronous discussion virtually to substitute for in-person clinic visit. Subjective:      Sekou Alston is a 49 y.o. patient who is seen for follow up after atrial flutter ablation 4/14/2020  He had typical atrial flutter with RVR and severe LVH on echo sleep apnea and CKD    After ablation he has been in NSR  Intracardiac echo did not show LA thrombus  He has been feeling well   No chest pain or GRIER  Dr Ramirez Hall kindly asked me to see him    In the past:  He was admitted on 04/12/2020 with chest pain, diaphoresis, & near syncope x 1 day. Also with associated palpitations & SOB.     This was the 3rd episode of near syncope with palpitations; 1st episode occurred approx 6 months ago. No prior evaluation.     CXR normal on date of admission.     ECG in ER showed 2:1 atrial flutter with RVR (122 bpm). Troponin normal, elevated Cr & Hgb A1c (6.1). D-dimer normal.       Diltiazem IV gtt initiated, then discontinued at 1900 on 04/12/2020.   Amiodarone 400 mg po tid initiated on 04/12/2020, also with digoxin 250 mcg IV q 6 hrs & metoprolol 50 mg po q 8 hrs.     He continues with typical atrial flutter with RVR despite meds     On CPAP     [id number] 1 (HTN), but prediabetic per Hgb A1c.       xarelto first dose 4/13/2020     Morbidly obese, reportedly considering gastric bypass but is delayed due COVID 19 elective surgery postponement        Limited echo (04/12/2020): LVEF 65-70%, no RWMA, severe concentric LVH.        Previous:  SHARA on BiPAP.     Bilat lower extremity edema with (reportedly) related neuropathy. On gabapentin  Problem List  Date Reviewed: 8/18/2020          Codes Class Noted    Status post catheter ablation of atrial flutter ICD-10-CM: Z98.890  ICD-9-CM: V45.89  4/14/2020    Overview Signed 4/14/2020 12:14 PM by Peyton Beasley MD     4/14/2020             Persistent atrial fibrillation ([facility] ICD-10-CM: I48.19  ICD-9-CM: 427.31  4/12/2020        Atrial flutter ([facility] ICD-10-CM: X29.79  ICD-9-CM: 427.32  4/12/2020        Morbid obesity ([facility] ICD-10-CM: E66.01  ICD-9-CM: 278.01  2/09/8341        Umbilical hernia ZIU-41-PZ: K42.9  ICD-9-CM: 553.1  4/12/2020        Hypertensive heart disease without heart failure ICD-10-CM: I11.9  ICD-9-CM: 402.90  4/12/2020        Neuropathic pain ICD-10-CM: M79.2  ICD-9-CM: 729.2  4/12/2020        Chest pain ICD-10-CM: R07.9  ICD-9-CM: 786.50  4/12/2020        HLD (hyperlipidemia) ICD-10-CM: E78.5  ICD-9-CM: 272.4  Unknown        SHARA treated with BiPAP ICD-10-CM: G47.33  ICD-9-CM: 327.23  Unknown        Near syncope ICD-10-CM: R55  ICD-9-CM: 780.2  Unknown        Depression ICD-10-CM: F32.9  ICD-9-CM: 311  Unknown        Edema of both legs ICD-10-CM: R60.0  ICD-9-CM: 887. 3  Unknown        Elevated serum glucose ICD-10-CM: R73.9  ICD-9-CM: 790.29  Unknown        Elevated serum creatinine ICD-10-CM: R79.89  ICD-9-CM: 790.99  Unknown        Typical atrial flutter (HCC) ICD-10-CM: I48.3  ICD-9-CM: 427.32  4/12/2020              Current Outpatient Medications   Medication Sig Dispense Refill    herbal drugs (FIBER DIET PO) Take  by mouth.  rivaroxaban (XARELTO) 20 mg tab tablet Take 1 Tab by mouth daily (with dinner). 90 Tab 1    lubiPROStone (Amitiza) 8 mcg capsule Take 8 mcg by mouth two (2) times daily (with meals).  citalopram (CELEXA) 20 mg tablet Take 20 mg by mouth daily.  lisinopriL (PRINIVIL, ZESTRIL) 20 mg tablet Take 20 mg by mouth daily.       furosemide (LASIX) 20 mg tablet Take 20 mg by mouth daily. Indications: visible water retention      amLODIPine (NORVASC) 10 mg tablet Take 10 mg by mouth daily.  atorvastatin (LIPITOR) 20 mg tablet Take 20 mg by mouth daily.  b complex vitamins tablet Take 1 Tab by mouth daily.  gabapentin (NEURONTIN) 600 mg tablet Take 600 mg by mouth nightly.  fluticasone propionate (Flonase Allergy Relief) 50 mcg/actuation nasal spray 2 Sprays by Both Nostrils route daily.  loratadine-pseudoephedrine (Claritin-D 24 Hour)  mg per tablet Take 1 Tab by mouth daily.        No Known Allergies  Past Medical History:   Diagnosis Date    Atrial flutter ([address]     Depression     Edema of both legs     Elevated serum creatinine     Elevated serum glucose     HLD (hyperlipidemia)     Hypertensive heart disease without heart failure 4/12/2020    Morbid obesity ([address] 4/12/2020    Near syncope     Neuropathic pain 4/12/2020    SHARA treated with BiPAP     Persistent atrial fibrillation 4/23/1889    Umbilical hernia 0/74/3976     Past Surgical History:   Procedure Laterality Date    HX HEENT      Jaw    HX ORTHOPAEDIC      Left Femur    HX ORTHOPAEDIC      Left Hip    [place] EPHYS EVAL W/ABLATION SUPRAVENT ARRHYTHMIA N/A 4/14/2020    ABLATION A-FLUTTER performed by Wilson Chin MD at [address] CATH LAB    [place] INTRACARDIAC ELECTROPHYSIOLOGIC 3D MAPPING N/A 4/14/2020    Ep 3d Mapping performed by Wilson Chin MD at [address] CATH LAB     Family History   Problem Relation Age of Onset    Diabetes Mother    Vernon Olvera Schizophrenia Mother     Diabetes Father     Prostate Cancer Father     Kidney Disease Father         ESRD ON HD    Diabetes Sister     Diabetes Brother     Kidney Disease Brother         ESRD ON HD     Social History     Tobacco Use    Smoking status: Never Smoker    Smokeless tobacco: Never Used   Substance Use Topics    Alcohol use: Not Currently     Comment: rarely        Review of Systems: all other systems negative  Constitutional: Negative for fever, chills, weight loss, malaise/fatigue. HEENT: Negative for nosebleeds, vision changes. Respiratory: Negative for cough, hemoptysis  Cardiovascular: Negative for chest pain, palpitations, orthopnea, claudication, + leg swelling, no syncope, and PND. Gastrointestinal: Negative for nausea, vomiting, diarrhea, blood in stool and melena. Genitourinary: Negative for dysuria, and hematuria. Musculoskeletal: Negative for myalgias, arthralgia. Skin: Negative for rash. Heme: Does not bleed or bruise easily. Neurological: Negative for speech change and focal weakness     Objective:      Due to this being a TeleHealth evaluation, many elements of the physical examination are unable to be assessed. General: Well developed, in no acute distress, cooperative and alert  HEENT: No marked JVD visible on video. Respiratory: No audible wheezing, no signs of respiratory distress, lips non cyanotic  Extremities: not able to see, he was in his car  Neuro: A&Ox3, speech clear, no facial droop, answering questions appropriately  Skin: Non diaphoretic on visible skin during exam        Assessment/Plan:       ICD-10-CM ICD-9-CM    1. Status post catheter ablation of atrial flutter  Z98.890 V45.89    2. Morbid obesity ([address]  E66.01 278.01    3. Typical atrial flutter (HCC)  I48.3 427.32    4. Hypertensive heart disease without heart failure  I11.9 402.90       He has done well since atrial flutter ablation   He is seen virtually so cannot check his BP and HR and ECG   He said he is in NSR  I have discussed with him about recurrence and new AFIB risk and recommend him continue with xarelto  He needs to lose weight   He agrees with the plan    Future Appointments   Date Time Provider Yaya Mata   4/20/2021  3:20 PM MD ÁNGEL Thomson [facility]       Thank you for involving me in this patient's care and please call with further concerns or questions.       Homero Guido, M.D.  Electrophysiology/Cardiology  [facility]  [address] [place] [address]                             [phone]

## 2020-12-02 ENCOUNTER — TELEPHONE (OUTPATIENT)
Dept: CARDIOLOGY CLINIC | Age: 55
End: 2020-12-02

## 2020-12-02 NOTE — TELEPHONE ENCOUNTER
Will ask MD- will also send [employer] message back to patient that hospital should be able to tell him regarding possible interactions with current medications

## 2020-12-02 NOTE — TELEPHONE ENCOUNTER
----- Message from Mark Yeh RN sent at 12/2/2020  2:53 PM EST -----  Regarding: FW: Non-Urgent Medical Question  Contact: [phone]    ----- Message -----  From: Steven Jordan  Sent: 12/2/2020   2:50 PM EST  To: [facility] Nurse Pool  Subject: Non-Urgent Medical Question                      I am currently admitted at [facility] with covid-19, pneumonia. The  want me to try Remdesevir -- Antiviral med and Convalescent plasma--Covid antibodies. Will these medicine interfere with my current medications? I need to here from someone asap.  Thanks

## 2021-05-14 ENCOUNTER — TELEPHONE (OUTPATIENT)
Dept: CARDIOLOGY CLINIC | Age: 56
End: 2021-05-14

## 2021-05-14 NOTE — TELEPHONE ENCOUNTER
This message is to inform you that the patient has not yet read the following message. (Notification date: May 13, 2021)   RE: Appointment Request    From   Sunithalinus Tessy To   [address] and Delivered   4/29/2021  1:57 PM   Good Afternoon Mr. Daria Navarro,     I have scheduled you to see Dr. Aamir Reyes on 7/13/2021 at 1:00pm.       Hope you Have a Wonderful Day!! Alivia       Previous Messages    ----- Message -----        From:Roberto Zurita   Sent:4/29/2021 12:58 PM EDT          To: Rosanna Joseph MD     Subject:Appointment Request     Appointment Request From: Val Rios     With Provider: MD DR TAQUERIA aVldez [facility]]     Preferred Date Range: Any     Preferred Times: Any Time     Reason for visit: Request an Appointment     Comments:   Check up     Audit Trail    MyChart User Last Read On   Val Rios Not Read

## 2021-05-14 NOTE — TELEPHONE ENCOUNTER
I received an alert stating patient has not read my response. Contacted patient and verified patient by two identifiers. Informed patient of his upcoming in-office appointment with Dr. Aamir Reyes on 7/13/2021. Patient stated he did receive my email response and will be here for scheduled appointment. Patient had no additional questions and verbalized all understanding.     Future Appointments   Date Time Provider Yaya Mata   7/13/2021  1:00 PM MD ÁNGEL Aldana [facility]

## 2022-03-18 PROBLEM — R07.9 CHEST PAIN: Status: ACTIVE | Noted: 2020-04-12

## 2022-03-18 PROBLEM — I48.92 ATRIAL FLUTTER (HCC): Status: ACTIVE | Noted: 2020-04-12

## 2022-03-19 PROBLEM — M79.2 NEUROPATHIC PAIN: Status: ACTIVE | Noted: 2020-04-12

## 2022-03-19 PROBLEM — I11.9 HYPERTENSIVE HEART DISEASE WITHOUT HEART FAILURE: Status: ACTIVE | Noted: 2020-04-12

## 2022-03-19 PROBLEM — I48.19 PERSISTENT ATRIAL FIBRILLATION (HCC): Status: ACTIVE | Noted: 2020-04-12

## 2022-03-19 PROBLEM — I48.3 TYPICAL ATRIAL FLUTTER (HCC): Status: ACTIVE | Noted: 2020-04-12

## 2022-03-20 PROBLEM — E66.01 MORBID OBESITY (HCC): Status: ACTIVE | Noted: 2020-04-12

## 2022-03-20 PROBLEM — Z98.890 STATUS POST CATHETER ABLATION OF ATRIAL FLUTTER: Status: ACTIVE | Noted: 2020-04-14

## 2022-03-20 PROBLEM — K42.9 UMBILICAL HERNIA: Status: ACTIVE | Noted: 2020-04-12

## 2022-09-14 ENCOUNTER — DOCUMENTATION ONLY (OUTPATIENT)
Dept: CARDIOLOGY CLINIC | Age: 57
End: 2022-09-14

## 2022-09-14 NOTE — PROGRESS NOTES
Mariaelena G[facility] ER visit  Patient called and wanted follow up appt  No shows in the past  [phone]

## 2022-09-26 ENCOUNTER — HOSPITAL ENCOUNTER (OUTPATIENT)
Dept: PREADMISSION TESTING | Age: 57
Discharge: HOME | End: 2022-09-26

## 2022-09-26 VITALS
RESPIRATION RATE: 16 BRPM | OXYGEN SATURATION: 96 % | SYSTOLIC BLOOD PRESSURE: 153 MMHG | TEMPERATURE: 98.7 F | DIASTOLIC BLOOD PRESSURE: 87 MMHG | HEIGHT: 74 IN | HEART RATE: 69 BPM | BODY MASS INDEX: 40.43 KG/M2 | WEIGHT: 315 LBS

## 2022-09-26 RX ORDER — IBUPROFEN AND FAMOTIDINE 26.6; 8 MG/1; MG/1
1 TABLET, FILM COATED ORAL 3 TIMES DAILY
COMMUNITY

## 2022-09-26 RX ORDER — HYDROXYZINE 25 MG/1
25 TABLET, FILM COATED ORAL DAILY
COMMUNITY

## 2022-09-26 NOTE — PERIOP NOTES
1040 Patient stated during PAT visit that he was instructed by Dr. Bradshaw Nurse to hold duexis 24 hours prior to procedure.

## 2022-10-03 ENCOUNTER — HOSPITAL ENCOUNTER (OUTPATIENT)
Age: 57
Discharge: HOME | End: 2022-10-03
Attending: INTERNAL MEDICINE | Admitting: INTERNAL MEDICINE
Payer: MEDICAID

## 2022-10-03 VITALS
RESPIRATION RATE: 20 BRPM | OXYGEN SATURATION: 98 % | TEMPERATURE: 98.5 F | HEART RATE: 58 BPM | SYSTOLIC BLOOD PRESSURE: 151 MMHG | DIASTOLIC BLOOD PRESSURE: 88 MMHG

## 2022-10-03 DIAGNOSIS — R42 DIZZINESS: ICD-10-CM

## 2022-10-03 PROBLEM — I48.91 ATRIAL FIBRILLATION (HCC): Status: ACTIVE | Noted: 2022-10-03

## 2022-10-03 LAB
GLUCOSE BLD STRIP.AUTO-MCNC: 108 MG/DL (ref 65–100)
PERFORMED BY, TECHID: ABNORMAL

## 2022-10-03 PROCEDURE — 33285 INSJ SUBQ CAR RHYTHM MNTR: CPT | Performed by: INTERNAL MEDICINE

## 2022-10-03 PROCEDURE — 82962 GLUCOSE BLOOD TEST: CPT

## 2022-10-03 PROCEDURE — 74011000250 HC RX REV CODE- 250: Performed by: INTERNAL MEDICINE

## 2022-10-03 PROCEDURE — 2709999900 HC NON-CHARGEABLE SUPPLY: Performed by: INTERNAL MEDICINE

## 2022-10-03 PROCEDURE — 74011250636 HC RX REV CODE- 250/636: Performed by: INTERNAL MEDICINE

## 2022-10-03 PROCEDURE — C1764 EVENT RECORDER, CARDIAC: HCPCS | Performed by: INTERNAL MEDICINE

## 2022-10-03 PROCEDURE — 76210000021 HC REC RM PH II 0.5 TO 1 HR: Performed by: INTERNAL MEDICINE

## 2022-10-03 DEVICE — ICM LNQ22 LINQ II USA
Type: IMPLANTABLE DEVICE | Site: CHEST | Status: FUNCTIONAL
Brand: LINQ II™

## 2022-10-03 RX ORDER — SODIUM CHLORIDE 9 MG/ML
50 INJECTION, SOLUTION INTRAVENOUS CONTINUOUS
Status: DISCONTINUED | OUTPATIENT
Start: 2022-10-03 | End: 2022-10-03 | Stop reason: HOSPADM

## 2022-10-03 RX ORDER — LIDOCAINE HYDROCHLORIDE 10 MG/ML
INJECTION INFILTRATION; PERINEURAL AS NEEDED
Status: DISCONTINUED | OUTPATIENT
Start: 2022-10-03 | End: 2022-10-03 | Stop reason: HOSPADM

## 2022-10-03 RX ADMIN — SODIUM CHLORIDE 50 ML/HR: 9 INJECTION, SOLUTION INTRAVENOUS at 11:51

## 2022-10-03 NOTE — PROGRESS NOTES
DRESSING TO CHEST DRY AND INTACT , DISCHARGE INSTRUCTIONS GIVEN TO PT AND PT'S BROTHER , BOTH VERBALIZED  UNDERSTANDING , NO DISTRESS NOTED

## 2022-10-03 NOTE — Clinical Note
TRANSFER - OUT REPORT:     Verbal report given to: Marlon Ware. Report consisted of patient's Situation, Background, Assessment and   Recommendations(SBAR). Opportunity for questions and clarification was provided. Patient transported with a Registered Nurse. Patient transported to: [facility].

## 2023-12-26 ENCOUNTER — TELEPHONE (OUTPATIENT)
Age: 58
End: 2023-12-26

## 2023-12-26 NOTE — TELEPHONE ENCOUNTER
Received fax request from [facility] pre adm testing for office notes, EKG  and echo.  Faxed t [phone]

## 2023-12-29 ENCOUNTER — TRANSCRIBE ORDERS (OUTPATIENT)
Facility: HOSPITAL | Age: 58
End: 2023-12-29

## 2023-12-29 DIAGNOSIS — Z01.810 PRE-OPERATIVE CARDIOVASCULAR EXAMINATION: ICD-10-CM

## 2023-12-29 DIAGNOSIS — R07.9 CHEST PAIN, UNSPECIFIED TYPE: Primary | ICD-10-CM

## (undated) DEVICE — 3M™ TEGADERM™ TRANSPARENT FILM DRESSING FRAME STYLE, 1626W, 4 IN X 4-3/4 IN (10 CM X 12 CM), 50/CT 4CT/CASE: Brand: 3M™ TEGADERM™

## (undated) DEVICE — CABLE FOR DIAGNOSTIC CATHETER: Brand: CABLE, SURELINK™

## (undated) DEVICE — KIT ELECTRD SURF FOR DISPLAYING THE 3D POS OF EP CATH

## (undated) DEVICE — PACK PROCEDURE SURG HRT CATH

## (undated) DEVICE — SOLID-TIP ABLATION CATHETER CABLE, STERILE CABLE: Brand: BLAZER™

## (undated) DEVICE — SYRINGE ANGIO 10 CC BRL M LUER CONN POLYCARB YEL MEDALLION

## (undated) DEVICE — GOWN,PREVENTION PLUS,XLN/XL,ST,24/CS: Brand: MEDLINE

## (undated) DEVICE — SHEET,DRAPE,40X58,STERILE: Brand: MEDLINE

## (undated) DEVICE — GOWN,SIRUS,NONRNF,SETINSLV,2XL,18/CS: Brand: MEDLINE

## (undated) DEVICE — CABLE EXT EP H/O/D BLK 150CM --

## (undated) DEVICE — INTRODUCER SHTH 8FR L63CM 8FR DIL GWIRE L145CM DIA0.038IN

## (undated) DEVICE — DRAPE PRB US TRNSDCR 6X96IN --

## (undated) DEVICE — DRAPE,APERTURE,MINOR PROCEDURE: Brand: MEDLINE

## (undated) DEVICE — 3M™ STERI-STRIP™ REINFORCED ADHESIVE SKIN CLOSURES, R1542, 1/4 IN X 1-1/2 IN (6 MM X 38 MM), 6 STRIPS/ENVELOPE: Brand: 3M™ STERI-STRIP™

## (undated) DEVICE — Device: Brand: JELCO

## (undated) DEVICE — GAUZE,SPONGE,4"X4",16PLY,STRL,LF,10/TRAY: Brand: MEDLINE

## (undated) DEVICE — INTRODUCER SHTH 6FR L12CM DIA0.038IN HEMSTAS CLOSE TOL

## (undated) DEVICE — APPLICATOR MEDICATED 10.5 CC SOLUTION SCRB TEAL CHLORAPREP

## (undated) DEVICE — TOWEL,OR,DSP,ST,BLUE,DLX,6/PK,12PK/CS: Brand: MEDLINE

## (undated) DEVICE — NEEDLE ANGIO 18GAX7CM SECURELOC

## (undated) DEVICE — CATHETER EP ADOL AD 9FR L90CM TEMP SGL HND SELF LOK 4 W TIP

## (undated) DEVICE — NON-REM POLYHESIVE PATIENT RETURN ELECTRODE: Brand: VALLEYLAB

## (undated) DEVICE — Device: Brand: PADPRO

## (undated) DEVICE — HOLDER SHRP TEMP FOR FULL LD SYR SHORTSTOP ADVNTG

## (undated) DEVICE — CATHETER EP L110CM OD8FR L10MM 2.5MM SPC QPLR STR TIP BLZR

## (undated) DEVICE — INTRODUCER SHTH 10FR L12CM DIA0.038IN CLOSE TOL EXTRUSION

## (undated) DEVICE — CATH RMFG LIVWR 6FRX115 --

## (undated) DEVICE — PAD,NON-ADHERENT,2X3,STERILE,LF,1/PK: Brand: MEDLINE